# Patient Record
Sex: MALE | Race: WHITE | NOT HISPANIC OR LATINO | Employment: FULL TIME | URBAN - METROPOLITAN AREA
[De-identification: names, ages, dates, MRNs, and addresses within clinical notes are randomized per-mention and may not be internally consistent; named-entity substitution may affect disease eponyms.]

---

## 2017-07-10 ENCOUNTER — TRANSFERRED RECORDS (OUTPATIENT)
Dept: HEALTH INFORMATION MANAGEMENT | Facility: CLINIC | Age: 57
End: 2017-07-10

## 2018-08-01 NOTE — PROGRESS NOTES
"  SUBJECTIVE:   CC: Waqar Troncoso is an 58 year old male who presents for preventative health visit.     Healthy Habits:    Do you get at least three servings of calcium containing foods daily (dairy, green leafy vegetables, etc.)? {YES/NO, DAIRY INTAKE:193241::\"yes\"}    Amount of exercise or daily activities, outside of work: {AMOUNT EXERCISE:855266}    Problems taking medications regularly {Yes /No default:858088::\"No\"}    Medication side effects: {Yes /No default.:175883::\"No\"}    Have you had an eye exam in the past two years? {YESNOBLANK:136175}    Do you see a dentist twice per year? {YESNOBLANK:878048}    Do you have sleep apnea, excessive snoring or daytime drowsiness?{YESNOBLANK:255632}  {Outside tests to abstract? :439821}     {additional problems to add (Optional):647088}    Today's PHQ-2 Score:   PHQ-2 ( 1999 Pf[employer]) 8/7/2012   Q1: Little interest or pleasure in doing things 0   Q2: Feeling down, depressed or hopeless 0   PHQ-2 Score 0     {PHQ-2 LOOK IN ASSESSMENTS :135930}  Abuse: Current or Past(Physical, Sexual or Emotional)- {YES/NO/NA:157666}  Do you feel safe in your environment - {YES/NO/NA:326902}    Social History   Substance Use Topics     Smoking status: Current Every Day Smoker     Packs/day: 1.00     Years: 10.00     Types: Cigarettes     Smokeless tobacco: Former User     Types: Chew     Alcohol use 1.5 oz/week     3 drink(s) per week      Comment: 3 drinks per month      If you drink alcohol do you typically have >3 drinks per day or >7 drinks per week? {ETOH :476582}                      Last PSA: No results found for: PSA    Reviewed orders with patient. Reviewed health maintenance and updated orders accordingly - {Yes/No:389486::\"Yes\"}  {Chronicprobdata (Optional):986258}    Reviewed and updated as needed this visit by clinical staff         Reviewed and updated as needed this visit by Provider        {HISTORY OPTIONS (Optional):045079}    ROS:  { :279919::\"CONSTITUTIONAL: NEGATIVE " "for fever, chills, change in weight\",\"INTEGUMENTARY/SKIN: NEGATIVE for worrisome rashes, moles or lesions\",\"EYES: NEGATIVE for vision changes or irritation\",\"ENT: NEGATIVE for ear, mouth and throat problems\",\"RESP: NEGATIVE for significant cough or SOB\",\"CV: NEGATIVE for chest pain, palpitations or peripheral edema\",\"GI: NEGATIVE for nausea, abdominal pain, heartburn, or change in bowel habits\",\" male: negative for dysuria, hematuria, decreased urinary stream, erectile dysfunction, urethral discharge\",\"MUSCULOSKELETAL: NEGATIVE for significant arthralgias or myalgia\",\"NEURO: NEGATIVE for weakness, dizziness or paresthesias\",\"PSYCHIATRIC: NEGATIVE for changes in mood or affect\"}    OBJECTIVE:   There were no vitals taken for this visit.  EXAM:  {Exam Choices:952550}    {Diagnostic Test Results (Optional):892261::\"Diagnostic Test Results:\",\"none \"}    ASSESSMENT/PLAN:   {Diag Picklist:138062}    COUNSELING:  {MALE COUNSELING MESSAGES:700165::\"Reviewed preventive health counseling, as reflected in patient instructions\"}    BP Readings from Last 1 Encounters:   08/07/12 115/71     Estimated body mass index is 29.72 kg/(m^2) as calculated from the following:    Height as of 8/7/12: 6' (1.829 m).    Weight as of 8/7/12: 219 lb 2 oz (99.4 kg).    {BP Counseling- Complete if BP >= 120/80  (Optional):456871}  {Weight Management Plan (ACO) Complete if BMI is abnormal-  Ages 18-64  BMI >24.9.  Age 65+ with BMI <23 or >30 (Optional):872889}     reports that he has been smoking Cigarettes.  He has a 10.00 pack-year smoking history. He has quit using smokeless tobacco. His smokeless tobacco use included Chew.  {Tobacco Cessation -- Complete if patient is a smoker (Optional):924375}    Counseling Resources:  ATP IV Guidelines  Pooled Cohorts Equation Calculator  FRAX Risk Assessment  ICSI Preventive Guidelines  Dietary Guidelines for Americans, 2010  USDA's MyPlate  ASA Prophylaxis  Lung CA Screening    Tba Landry, " BRUNO  [facility]

## 2018-08-02 ENCOUNTER — OFFICE VISIT (OUTPATIENT)
Dept: FAMILY MEDICINE | Facility: CLINIC | Age: 58
End: 2018-08-02
Payer: COMMERCIAL

## 2018-08-02 VITALS
HEIGHT: 72 IN | TEMPERATURE: 97.3 F | OXYGEN SATURATION: 97 % | BODY MASS INDEX: 29.53 KG/M2 | RESPIRATION RATE: 14 BRPM | SYSTOLIC BLOOD PRESSURE: 114 MMHG | DIASTOLIC BLOOD PRESSURE: 71 MMHG | WEIGHT: 218 LBS | HEART RATE: 79 BPM

## 2018-08-02 DIAGNOSIS — Z71.89 ADVANCED DIRECTIVES, COUNSELING/DISCUSSION: ICD-10-CM

## 2018-08-02 DIAGNOSIS — Z00.00 ROUTINE GENERAL MEDICAL EXAMINATION AT A HEALTH CARE FACILITY: Primary | ICD-10-CM

## 2018-08-02 DIAGNOSIS — Z11.59 NEED FOR HEPATITIS C SCREENING TEST: ICD-10-CM

## 2018-08-02 DIAGNOSIS — I10 BENIGN ESSENTIAL HYPERTENSION: ICD-10-CM

## 2018-08-02 DIAGNOSIS — E78.5 HYPERLIPIDEMIA LDL GOAL <100: ICD-10-CM

## 2018-08-02 DIAGNOSIS — E11.9 TYPE 2 DIABETES MELLITUS WITHOUT COMPLICATION, WITHOUT LONG-TERM CURRENT USE OF INSULIN (H): ICD-10-CM

## 2018-08-02 LAB
ALBUMIN SERPL-MCNC: 4.2 G/DL (ref 3.4–5)
ALP SERPL-CCNC: 67 U/L (ref 40–150)
ALT SERPL W P-5'-P-CCNC: 30 U/L (ref 0–70)
ANION GAP SERPL CALCULATED.3IONS-SCNC: 10 MMOL/L (ref 3–14)
AST SERPL W P-5'-P-CCNC: 16 U/L (ref 0–45)
BILIRUB SERPL-MCNC: 1.2 MG/DL (ref 0.2–1.3)
BUN SERPL-MCNC: 17 MG/DL (ref 7–30)
CALCIUM SERPL-MCNC: 9.2 MG/DL (ref 8.5–10.1)
CHLORIDE SERPL-SCNC: 106 MMOL/L (ref 94–109)
CHOLEST SERPL-MCNC: 101 MG/DL
CO2 SERPL-SCNC: 25 MMOL/L (ref 20–32)
CREAT SERPL-MCNC: 0.78 MG/DL (ref 0.66–1.25)
CREAT UR-MCNC: 68 MG/DL
GFR SERPL CREATININE-BSD FRML MDRD: >90 ML/MIN/1.7M2
GLUCOSE SERPL-MCNC: 95 MG/DL (ref 70–99)
HBA1C MFR BLD: 8.9 % (ref 0–5.6)
HDLC SERPL-MCNC: 35 MG/DL
LDLC SERPL CALC-MCNC: 41 MG/DL
MICROALBUMIN UR-MCNC: 13 MG/L
MICROALBUMIN/CREAT UR: 19.85 MG/G CR (ref 0–17)
NONHDLC SERPL-MCNC: 66 MG/DL
POTASSIUM SERPL-SCNC: 4.3 MMOL/L (ref 3.4–5.3)
PROT SERPL-MCNC: 7.6 G/DL (ref 6.8–8.8)
SODIUM SERPL-SCNC: 141 MMOL/L (ref 133–144)
T4 FREE SERPL-MCNC: 0.81 NG/DL (ref 0.76–1.46)
TRIGL SERPL-MCNC: 126 MG/DL
TSH SERPL DL<=0.005 MIU/L-ACNC: 4.26 MU/L (ref 0.4–4)

## 2018-08-02 PROCEDURE — 99386 PREV VISIT NEW AGE 40-64: CPT | Performed by: PHYSICIAN ASSISTANT

## 2018-08-02 PROCEDURE — 86803 HEPATITIS C AB TEST: CPT | Performed by: PHYSICIAN ASSISTANT

## 2018-08-02 PROCEDURE — 36415 COLL VENOUS BLD VENIPUNCTURE: CPT | Performed by: PHYSICIAN ASSISTANT

## 2018-08-02 PROCEDURE — 80061 LIPID PANEL: CPT | Performed by: PHYSICIAN ASSISTANT

## 2018-08-02 PROCEDURE — 80053 COMPREHEN METABOLIC PANEL: CPT | Performed by: PHYSICIAN ASSISTANT

## 2018-08-02 PROCEDURE — 84439 ASSAY OF FREE THYROXINE: CPT | Performed by: PHYSICIAN ASSISTANT

## 2018-08-02 PROCEDURE — 83036 HEMOGLOBIN GLYCOSYLATED A1C: CPT | Performed by: PHYSICIAN ASSISTANT

## 2018-08-02 PROCEDURE — 82043 UR ALBUMIN QUANTITATIVE: CPT | Performed by: PHYSICIAN ASSISTANT

## 2018-08-02 PROCEDURE — 84443 ASSAY THYROID STIM HORMONE: CPT | Performed by: PHYSICIAN ASSISTANT

## 2018-08-02 RX ORDER — GLIPIZIDE 10 MG/1
10 TABLET, FILM COATED, EXTENDED RELEASE ORAL DAILY
COMMUNITY
Start: 2018-04-10 | End: 2018-08-02

## 2018-08-02 RX ORDER — TAMSULOSIN HYDROCHLORIDE 0.4 MG/1
0.4 CAPSULE ORAL DAILY
COMMUNITY
Start: 2017-10-12 | End: 2018-08-02

## 2018-08-02 RX ORDER — LISINOPRIL 10 MG/1
10 TABLET ORAL DAILY
Qty: 90 TABLET | Refills: 1 | Status: SHIPPED | OUTPATIENT
Start: 2018-08-02 | End: 2019-02-09

## 2018-08-02 RX ORDER — ATORVASTATIN CALCIUM 80 MG/1
80 TABLET, FILM COATED ORAL DAILY
COMMUNITY
Start: 2016-09-14 | End: 2018-08-02

## 2018-08-02 RX ORDER — ATORVASTATIN CALCIUM 80 MG/1
80 TABLET, FILM COATED ORAL DAILY
Qty: 90 TABLET | Refills: 3 | Status: SHIPPED | OUTPATIENT
Start: 2018-08-02 | End: 2019-03-28

## 2018-08-02 RX ORDER — GLIPIZIDE 10 MG/1
10 TABLET, FILM COATED, EXTENDED RELEASE ORAL DAILY
Qty: 90 TABLET | Refills: 1 | Status: SHIPPED | OUTPATIENT
Start: 2018-08-02 | End: 2019-03-28

## 2018-08-02 ASSESSMENT — ENCOUNTER SYMPTOMS
ARTHRALGIAS: 0
CONSTIPATION: 0
HEMATOCHEZIA: 0
NAUSEA: 0
SHORTNESS OF BREATH: 0
DYSURIA: 0
COUGH: 0
EYE PAIN: 0
JOINT SWELLING: 0
HEADACHES: 0
CHILLS: 0
PARESTHESIAS: 0
DIARRHEA: 1
FEVER: 0
NERVOUS/ANXIOUS: 0
MYALGIAS: 0
FREQUENCY: 0
DIZZINESS: 0
WEAKNESS: 0
HEMATURIA: 0
PALPITATIONS: 0
SORE THROAT: 0
ABDOMINAL PAIN: 0
HEARTBURN: 0

## 2018-08-02 NOTE — PROGRESS NOTES
SUBJECTIVE:   CC: Waqar Troncoso is an 58 year old male who presents for preventative health visit.  New patient due to insurance change.    Physical   Annual:     Getting at least 3 servings of Calcium per day:  NO    Bi-annual eye exam:  Yes    Dental care twice a year:  Yes    Sleep apnea or symptoms of sleep apnea:  None    Diet:  Diabetic    Frequency of exercise:  2-3 days/week    Duration of exercise:  15-30 minutes    Taking medications regularly:  Yes    Medication side effects:  None    Additional concerns today:  No        No concerns   History of DM, Hyperlipidemia and hypertension.   Was on meds for BPH but is asymptomatic.     Today's PHQ-2 Score:   PHQ-2 ( 1999 Pfizer) 8/2/2018   Q1: Little interest or pleasure in doing things 0   Q2: Feeling down, depressed or hopeless 0   PHQ-2 Score 0   Q1: Little interest or pleasure in doing things Not at all   Q2: Feeling down, depressed or hopeless Not at all   PHQ-2 Score 0       Abuse: Current or Past(Physical, Sexual or Emotional)- No  Do you feel safe in your environment - Yes    Social History   Substance Use Topics     Smoking status: Current Every Day Smoker     Packs/day: 1.00     Years: 10.00     Types: Cigarettes     Smokeless tobacco: Former User     Types: Chew     Alcohol use 1.5 oz/week     3 Standard drinks or equivalent per week      Comment: 3 drinks per month     Alcohol Use 8/2/2018   If you drink alcohol do you typically have greater than 3 drinks per day OR greater than 7 drinks per week? No     Last PSA: No results found for: PSA    Reviewed orders with patient. Reviewed health maintenance and updated orders accordingly - Yes  Labs reviewed in EPIC  BP Readings from Last 3 Encounters:   08/02/18 114/71   08/07/12 115/71    Wt Readings from Last 3 Encounters:   08/02/18 218 lb (98.9 kg)   08/07/12 219 lb 2 oz (99.4 kg)                  Patient Active Problem List   Diagnosis     Hyperlipidemia LDL goal <100     Type 2 diabetes, HbA1c goal <  7% (H)     Advanced directives, counseling/discussion     Benign essential hypertension     Past Surgical History:   Procedure Laterality Date     COLONOSCOPY       FRACTURE TX, ANKLE RT/LT  1999    Fracture TX Ankle left, 13 surgeries per pt     HERNIA REPAIR, UMBILICAL  1995     ROTATOR CUFF REPAIR RT/LT  ~1990    right and left     VASCULAR SURGERY  >1999    reconstruction post left ankle injury       Social History   Substance Use Topics     Smoking status: Current Every Day Smoker     Packs/day: 1.00     Years: 10.00     Types: Cigarettes     Smokeless tobacco: Former User     Types: Chew     Alcohol use 1.5 oz/week     3 Standard drinks or equivalent per week      Comment: 3 drinks per month     Family History   Problem Relation Age of Onset     Diabetes Mother      Diabetes Father      Diabetes Maternal Grandmother      Diabetes Maternal Grandfather      Diabetes Paternal Grandmother      Diabetes Paternal Grandfather      Cancer - colorectal No family hx of      Prostate Cancer No family hx of          Current Outpatient Prescriptions   Medication Sig Dispense Refill     aspirin 81 MG tablet Take 1 tablet (81 mg) by mouth daily See provider for further refills. 1 tablet 0     atorvastatin (LIPITOR) 80 MG tablet Take 1 tablet (80 mg) by mouth daily 90 tablet 3     glipiZIDE (GLUCOTROL XL) 10 MG 24 hr tablet Take 1 tablet (10 mg) by mouth daily 90 tablet 1     lisinopril (PRINIVIL/ZESTRIL) 10 MG tablet Take 1 tablet (10 mg) by mouth daily 90 tablet 1     LISINOPRIL PO Take 10 mg by mouth daily       metFORMIN (GLUCOPHAGE) 1000 MG tablet Take 1 tablet (1,000 mg) by mouth 2 times daily (with meals) 180 tablet 1     [DISCONTINUED] atorvastatin (LIPITOR) 80 MG tablet Take 80 mg by mouth daily       [DISCONTINUED] glipiZIDE (GLUCOTROL XL) 10 MG 24 hr tablet Take 10 mg by mouth daily       [DISCONTINUED] metFORMIN (GLUCOPHAGE) 1000 MG tablet Take 1,000 mg by mouth 2 times daily (with meals).       Allergies    Allergen Reactions     Oxycontin [Kdc:Ci Pigment Blue 63+Oxycodone]      Sweating, hyperventilating per patient      Recent Labs   Lab Test  08/07/12   0837   A1C  6.4*   LDL  77   HDL  34*   TRIG  170*   CR  0.78   GFRESTIMATED  >90   GFRESTBLACK  >90   TSH  4.11        Reviewed and updated as needed this visit by clinical staff  Tobacco  Allergies  Meds  Med Hx  Surg Hx  Fam Hx  Soc Hx        Reviewed and updated as needed this visit by Provider  Surg Hx          Past Medical History:   Diagnosis Date     Benign essential hypertension 8/2/2018      Past Surgical History:   Procedure Laterality Date     COLONOSCOPY       FRACTURE TX, ANKLE RT/LT  1999    Fracture TX Ankle left, 13 surgeries per pt     HERNIA REPAIR, UMBILICAL  1995     ROTATOR CUFF REPAIR RT/LT  ~1990    right and left     VASCULAR SURGERY  >1999    reconstruction post left ankle injury       Review of Systems   Constitutional: Negative for chills and fever.   HENT: Negative for congestion, ear pain, hearing loss and sore throat.    Eyes: Negative for pain and visual disturbance.   Respiratory: Negative for cough and shortness of breath.    Cardiovascular: Negative for chest pain, palpitations and peripheral edema.   Gastrointestinal: Positive for diarrhea. Negative for abdominal pain, constipation, heartburn, hematochezia and nausea.   Genitourinary: Positive for impotence. Negative for discharge, dysuria, frequency, genital sores, hematuria and urgency.   Musculoskeletal: Negative for arthralgias, joint swelling and myalgias.   Skin: Negative for rash.   Neurological: Negative for dizziness, weakness, headaches and paresthesias.   Psychiatric/Behavioral: Negative for mood changes. The patient is not nervous/anxious.          OBJECTIVE:   /71  Pulse 79  Temp 97.3  F (36.3  C) (Oral)  Resp 14  Ht 6' (1.829 m)  Wt 218 lb (98.9 kg)  SpO2 97%  BMI 29.57 kg/m2    Physical Exam  GENERAL: healthy, alert and no distress  EYES: Eyes  grossly normal to inspection, PERRL and conjunctivae and sclerae normal  HENT: ear canals and TM's normal, nose and mouth without ulcers or lesions  NECK: no adenopathy, no asymmetry, masses, or scars and thyroid normal to palpation  RESP: lungs clear to auscultation - no rales, rhonchi or wheezes  CV: regular rate and rhythm, normal S1 S2, no S3 or S4, no murmur, click or rub, no peripheral edema and peripheral pulses strong  ABDOMEN: soft, nontender, no hepatosplenomegaly, no masses and bowel sounds normal   (male): normal male genitalia without lesions or urethral discharge, no hernia  MS: no gross musculoskeletal defects noted, no edema  SKIN: no suspicious lesions or rashes  NEURO: Normal strength and tone, mentation intact and speech normal  PSYCH: mentation appears normal, affect normal/bright    Diagnostic Test Results:  No results found for this or any previous visit (from the past 24 hour(s)).    ASSESSMENT/PLAN:       ICD-10-CM    1. Routine general medical examination at a health care facility Z00.00 Lipid panel reflex to direct LDL Fasting     Comprehensive metabolic panel   2. Type 2 diabetes mellitus without complication, without long-term current use of insulin (H) E11.9 HEMOGLOBIN A1C     Albumin Random Urine Quantitative with Creat Ratio     TSH WITH FREE T4 REFLEX     Comprehensive metabolic panel     DIABETES EDUCATOR REFERRAL     glipiZIDE (GLUCOTROL XL) 10 MG 24 hr tablet     metFORMIN (GLUCOPHAGE) 1000 MG tablet   3. Benign essential hypertension I10 lisinopril (PRINIVIL/ZESTRIL) 10 MG tablet   4. Hyperlipidemia LDL goal <100 E78.5 atorvastatin (LIPITOR) 80 MG tablet   5. Advanced directives, counseling/discussion Z71.89    6. Need for hepatitis C screening test Z11.59 Hepatitis C Screen Reflex to HCV RNA Quant and Genotype   labs pending  meds refilled.   Work on Healthy diet and exercise. Getting heart rate elevated for 30 mins most days of week.  Ok to stop flomax for now and to get back  on it if symptoms arise.   Recheck DM/blood pressure in 6 months.     COUNSELING:   Reviewed preventive health counseling, as reflected in patient instructions       Regular exercise       Healthy diet/nutrition    BP Readings from Last 1 Encounters:   08/02/18 114/71     Estimated body mass index is 29.57 kg/(m^2) as calculated from the following:    Height as of this encounter: 6' (1.829 m).    Weight as of this encounter: 218 lb (98.9 kg).      Weight management plan: Discussed healthy diet and exercise guidelines and patient will follow up in 12 months in clinic to re-evaluate.     reports that he has been smoking Cigarettes.  He has a 10.00 pack-year smoking history. He has quit using smokeless tobacco. His smokeless tobacco use included Chew.      Counseling Resources:  ATP IV Guidelines  Pooled Cohorts Equation Calculator  FRAX Risk Assessment  ICSI Preventive Guidelines  Dietary Guidelines for Americans, 2010  USDA's MyPlate  ASA Prophylaxis  Lung CA Screening    Tab Landry PA-C  [facility]

## 2018-08-02 NOTE — MR AVS SNAPSHOT
After Visit Summary   8/2/2018    Waqar Troncoso    MRN: [id number]           Patient Information     Date Of Birth          1960        Visit Information        Provider Department      8/2/2018 2:50 PM Tab Landry PA-C [facility]        Today's Diagnoses     Routine general medical examination at a health care facility    -  1    Type 2 diabetes mellitus without complication, without long-term current use of insulin (H)        Hyperlipidemia LDL goal <100        Advanced directives, counseling/discussion        Need for hepatitis C screening test        Benign essential hypertension          Care Instructions      Preventive Health Recommendations  Male Ages 50 - 64    Yearly exam:             See your health care provider every year in order to  o   Review health changes.   o   Discuss preventive care.    o   Review your medicines if your doctor has prescribed any.     Have a cholesterol test every 5 years, or more frequently if you are at risk for high cholesterol/heart disease.     Have a diabetes test (fasting glucose) every three years. If you are at risk for diabetes, you should have this test more often.     Have a colonoscopy at age 50, or have a yearly FIT test (stool test). These exams will check for colon cancer.      Talk with your health care provider about whether or not a prostate cancer screening test (PSA) is right for you.    You should be tested each year for STDs (sexually transmitted diseases), if you re at risk.     Shots: Get a flu shot each year. Get a tetanus shot every 10 years.     Nutrition:    Eat at least 5 servings of fruits and vegetables daily.     Eat whole-grain bread, whole-wheat pasta and brown rice instead of white grains and rice.     Get adequate Calcium and Vitamin D.     Lifestyle    Exercise for at least 150 minutes a week (30 minutes a day, 5 days a week). This will help you control your weight and prevent disease.     Limit  alcohol to one drink per day.     No smoking.     Wear sunscreen to prevent skin cancer.     See your dentist every six months for an exam and cleaning.     See your eye doctor every 1 to 2 years.            Follow-ups after your visit        Additional Services     DIABETES EDUCATOR REFERRAL       DIABETES SELF MANAGEMENT TRAINING (DSMT)      Your provider has referred you to Diabetes Education: FMG: Diabetes Education - All [facility] [phone]   [email or web address]     If an urgent visit is needed or A1C is above 12, Care Team to call the Diabetes  Education Team at [phone] or send an In Basket message to the Diabetes Education Pool (P DIAB ED-PATIENT CARE).    A  will call you to make your appointment. If it has been more than 3 business days since your referral was placed, please call the above phone number to schedule.    Type of training and number of hours: Previous Diagnosis: Follow-up DSMT - 2 hours.    Diabetes Type: Type 2 - On Oral Medication   Medicare covers: 10 hours of initial DSMT in 12 month period from the time of first visit, plus 2 hours of follow-up DSMT annually, and additional hours as requested for insulin training.         Diabetes Co-Morbidities: hypertension               A1C Goal:  <8.0       A1C is: Lab Results       Component                Value               Date                       A1C                      6.4                 08/07/2012              MEDICAL NUTRITION THERAPY (MNT) for Diabetes    Medical Nutrition Therapy with a Registered Dietitian can be provided in coordination with Diabetes Self-Management Training to assist in achieving optimal diabetes management.    MNT Type and Hours: Do not initiate MNT at this time.                       Medicare will cover: 3 hours initial MNT in 12 month period after first visit, plus 2 hours of follow-up MNT annually        Diabetes Education Topics:  "Comprehensive Knowledge Assessment and Instruction    Special Educational Needs Requiring Individual DSMT: None      Please be aware that coverage of these services is subject to the terms and limitations of your health insurance plan.  Call member services at your health plan to determine Diabetes Self-Management Training (Codes  and ) and Medical Nutrition Therapy (Codes 97151 and 11426) benefits and ask which blood glucose monitor brands are covered by your plan.  Please bring the following with you to your appointment:    (1)  List of current medications   (2)  List of Blood Glucose Monitor brands that are covered by your insurance plan  (3)  Blood Glucose Monitor and log book  (4)   Food records for the 3 days prior to your visit    The Certified Diabetes Educator may make diabetes medication adjustments per the CDE Protocol and Collaborative Practice Agreement.                  Who to contact     If you have questions or need follow up information about today's clinic visit or your schedule please contact [facility] directly at [phone].  Normal or non-critical lab and imaging results will be communicated to you by MyChart, letter or phone within 4 business days after the clinic has received the results. If you do not hear from us within 7 days, please contact the clinic through [employer] or phone. If you have a critical or abnormal lab result, we will notify you by phone as soon as possible.  Submit refill requests through [facility] or call your pharmacy and they will forward the refill request to us. Please allow 3 business days for your refill to be completed.          Additional Information About Your Visit        [facility] Information     [facility] lets you send messages to your doctor, view your test results, renew your prescriptions, schedule appointments and more. To sign up, go to [email or web address] . Click on \"Log in\" on the left side of the screen, which will take you to " "the Welcome page. Then click on \"Sign up Now\" on the right side of the page.     You will be asked to enter the access code listed below, as well as some personal information. Please follow the directions to create your username and password.     Your access code is: [email or web address]  Expires: 10/31/2018  3:10 PM     Your access code will  in 90 days. If you need help or a new code, please call your [place] clinic or [phone].        Care EveryWhere ID     This is your Care EveryWhere ID. This could be used by other organizations to access your [place] medical records  [id number]        Your Vitals Were     Pulse Temperature Respirations Height Pulse Oximetry BMI (Body Mass Index)    79 97.3  F (36.3  C) (Oral) 14 6' (1.829 m) 97% 29.57 kg/m2       Blood Pressure from Last 3 Encounters:   18 114/71   12 115/71    Weight from Last 3 Encounters:   18 218 lb (98.9 kg)   12 219 lb 2 oz (99.4 kg)              We Performed the Following     Albumin Random Urine Quantitative with Creat Ratio     Comprehensive metabolic panel     DIABETES EDUCATOR REFERRAL     HEMOGLOBIN A1C     Hepatitis C Screen Reflex to HCV RNA Quant and Genotype     Lipid panel reflex to direct LDL Fasting     TSH WITH FREE T4 REFLEX          Today's Medication Changes          These changes are accurate as of 18  3:10 PM.  If you have any questions, ask your nurse or doctor.               These medicines have changed or have updated prescriptions.        Dose/Directions    * LISINOPRIL PO   This may have changed:  Another medication with the same name was added. Make sure you understand how and when to take each.   Changed by:  Tab Landry PA-C        Dose:  10 mg   Take 10 mg by mouth daily   Refills:  0       * lisinopril 10 MG tablet   Commonly known as:  PRINIVIL/ZESTRIL   This may have changed:  You were already taking a medication with the same name, and this prescription was added. Make sure you " understand how and when to take each.   Used for:  Benign essential hypertension   Changed by:  Tab Landry PA-C        Dose:  10 mg   Take 1 tablet (10 mg) by mouth daily   Quantity:  90 tablet   Refills:  1       * Notice:  This list has 2 medication(s) that are the same as other medications prescribed for you. Read the directions carefully, and ask your doctor or other care provider to review them with you.         Where to get your medicines      These medications were sent to [id number] - [place], [place] - [address] [address]  [address] [address], [facility] [address]    Hours:  test fax successful 4/5/04 kr Phone:  [phone]     atorvastatin 80 MG tablet    glipiZIDE 10 MG 24 hr tablet    lisinopril 10 MG tablet    metFORMIN 1000 MG tablet                Primary Care Provider Office Phone # Fax #    [facility] [phone] [address] [address] [address]        Equal Access to Services     ТАТЬЯНА KENNEDY AH: Hadii aad ku hadasho Soomaali, waaxda luqadaha, qaybta kaalmada adeegyada, waxay idiin hayaan cortney henry . So [facility] [phone].    ATENCIÓN: Si habla español, tiene a phipps disposición servicios gratuitos de asistencia lingüística. [facility] [phone].    We comply with applicable federal civil rights laws and [place] laws. We do not discriminate on the basis of race, color, national origin, age, disability, sex, sexual orientation, or gender identity.            Thank you!     Thank you for choosing [facility]  for your care. Our goal is always to provide you with excellent care. Hearing back from our patients is one way we can continue to improve our services. Please take a few minutes to complete the written survey that you may receive in the mail after your visit with us. Thank you!             Your Updated Medication List - Protect others around you: Learn how to safely use, store and throw away your medicines at  www.disposemymeds.org.          This list is accurate as of 8/2/18  3:10 PM.  Always use your most recent med list.                   Brand Name Dispense Instructions for use Diagnosis    aspirin 81 MG tablet     1 tablet    Take 1 tablet (81 mg) by mouth daily See provider for further refills.    Type 2 diabetes, HbA1c goal < 7% (H)       atorvastatin 80 MG tablet    LIPITOR    90 tablet    Take 1 tablet (80 mg) by mouth daily    Hyperlipidemia LDL goal <100       glipiZIDE 10 MG 24 hr tablet    GLUCOTROL XL    90 tablet    Take 1 tablet (10 mg) by mouth daily    Type 2 diabetes mellitus without complication, without long-term current use of insulin (H)       * LISINOPRIL PO      Take 10 mg by mouth daily        * lisinopril 10 MG tablet    PRINIVIL/ZESTRIL    90 tablet    Take 1 tablet (10 mg) by mouth daily    Benign essential hypertension       metFORMIN 1000 MG tablet    GLUCOPHAGE    180 tablet    Take 1 tablet (1,000 mg) by mouth 2 times daily (with meals)    Type 2 diabetes mellitus without complication, without long-term current use of insulin (H)       * Notice:  This list has 2 medication(s) that are the same as other medications prescribed for you. Read the directions carefully, and ask your doctor or other care provider to review them with you.

## 2018-08-02 NOTE — NURSING NOTE
Chief Complaint   Patient presents with     Physical     Health Maintenance     ADP       Initial /71  Pulse 79  Temp 97.3  F (36.3  C) (Oral)  Resp 14  Ht 6' (1.829 m)  Wt 218 lb (98.9 kg)  SpO2 97%  BMI 29.57 kg/m2 Estimated body mass index is 29.57 kg/(m^2) as calculated from the following:    Height as of this encounter: 6' (1.829 m).    Weight as of this encounter: 218 lb (98.9 kg).  Medication Reconciliation: complete  Viry Kam, GINA

## 2018-08-03 LAB — HCV AB SERPL QL IA: NONREACTIVE

## 2018-12-19 ENCOUNTER — TELEPHONE (OUTPATIENT)
Dept: OTHER | Facility: CLINIC | Age: 58
End: 2018-12-19

## 2019-02-09 DIAGNOSIS — I10 BENIGN ESSENTIAL HYPERTENSION: ICD-10-CM

## 2019-02-11 RX ORDER — LISINOPRIL 10 MG/1
TABLET ORAL
Qty: 90 TABLET | Refills: 1 | Status: SHIPPED | OUTPATIENT
Start: 2019-02-11 | End: 2019-03-28

## 2019-03-28 ENCOUNTER — OFFICE VISIT (OUTPATIENT)
Dept: FAMILY MEDICINE | Facility: CLINIC | Age: 59
End: 2019-03-28
Payer: COMMERCIAL

## 2019-03-28 VITALS
BODY MASS INDEX: 28.18 KG/M2 | RESPIRATION RATE: 16 BRPM | HEART RATE: 67 BPM | WEIGHT: 207.8 LBS | TEMPERATURE: 97 F | DIASTOLIC BLOOD PRESSURE: 83 MMHG | OXYGEN SATURATION: 99 % | SYSTOLIC BLOOD PRESSURE: 133 MMHG

## 2019-03-28 DIAGNOSIS — I10 HYPERTENSION GOAL BP (BLOOD PRESSURE) < 140/90: ICD-10-CM

## 2019-03-28 DIAGNOSIS — E78.5 HYPERLIPIDEMIA LDL GOAL <100: ICD-10-CM

## 2019-03-28 DIAGNOSIS — E11.65 TYPE 2 DIABETES MELLITUS WITH HYPERGLYCEMIA, WITHOUT LONG-TERM CURRENT USE OF INSULIN (H): Primary | ICD-10-CM

## 2019-03-28 LAB — HBA1C MFR BLD: 7.8 % (ref 0–5.6)

## 2019-03-28 PROCEDURE — 99207 C FOOT EXAM  NO CHARGE: CPT | Performed by: FAMILY MEDICINE

## 2019-03-28 PROCEDURE — 83036 HEMOGLOBIN GLYCOSYLATED A1C: CPT | Performed by: FAMILY MEDICINE

## 2019-03-28 PROCEDURE — 36415 COLL VENOUS BLD VENIPUNCTURE: CPT | Performed by: FAMILY MEDICINE

## 2019-03-28 PROCEDURE — 99214 OFFICE O/P EST MOD 30 MIN: CPT | Performed by: FAMILY MEDICINE

## 2019-03-28 RX ORDER — GLIPIZIDE 10 MG/1
20 TABLET, FILM COATED, EXTENDED RELEASE ORAL DAILY
Qty: 180 TABLET | Refills: 3 | Status: SHIPPED | OUTPATIENT
Start: 2019-03-28 | End: 2020-03-16

## 2019-03-28 RX ORDER — LISINOPRIL 10 MG/1
10 TABLET ORAL DAILY
Qty: 90 TABLET | Refills: 3 | Status: SHIPPED | OUTPATIENT
Start: 2019-03-28 | End: 2020-07-09

## 2019-03-28 RX ORDER — ATORVASTATIN CALCIUM 80 MG/1
80 TABLET, FILM COATED ORAL DAILY
Qty: 90 TABLET | Refills: 3 | Status: SHIPPED | OUTPATIENT
Start: 2019-03-28 | End: 2020-07-09

## 2019-03-28 NOTE — PROGRESS NOTES
"  SUBJECTIVE:   CC: Waqar Troncoso is an 59 year old male who presents for preventive health visit.     Healthy Habits:    Do you get at least three servings of calcium containing foods daily (dairy, green leafy vegetables, etc.)? { :270147::\"yes\"}    Amount of exercise or daily activities, outside of work: { :364721}    Problems taking medications regularly { :838147::\"No\"}    Medication side effects: { :029693::\"No\"}    Have you had an eye exam in the past two years? { :442282}    Do you see a dentist twice per year? { :483410}    Do you have sleep apnea, excessive snoring or daytime drowsiness?{ :595784}  {Outside tests to abstract? :607387}    {additional problems to add (Optional):308798}    Today's PHQ-2 Score:   PHQ-2 ( 1999 [employer]) 8/2/2018 8/7/2012   Q1: Little interest or pleasure in doing things 0 0   Q2: Feeling down, depressed or hopeless 0 0   PHQ-2 Score 0 0   Q1: Little interest or pleasure in doing things Not at all -   Q2: Feeling down, depressed or hopeless Not at all -   PHQ-2 Score 0 -     {PHQ-2 LOOK IN ASSESSMENTS (Optional) :253914}  Abuse: Current or Past(Physical, Sexual or Emotional)- {YES/NO/NA:499114}  Do you feel safe in your environment? {YES/NO/NA:972741}    Social History     Tobacco Use     Smoking status: Current Every Day Smoker     Packs/day: 1.00     Years: 10.00     Pack years: 10.00     Types: Cigarettes     Smokeless tobacco: Former User     Types: Chew   Substance Use Topics     Alcohol use: Yes     Alcohol/week: 1.5 oz     Types: 3 Standard drinks or equivalent per week     Comment: 3 drinks per month     If you drink alcohol do you typically have >3 drinks per day or >7 drinks per week? {ETOH :469447}                      Last PSA: No results found for: PSA    Reviewed orders with patient. Reviewed health maintenance and updated orders accordingly - {Yes/No:970177::\"Yes\"}  {Chronicprobdata (Optional):524289}    Reviewed and updated as needed this visit by clinical " "staff         Reviewed and updated as needed this visit by Provider        {HISTORY OPTIONS (Optional):089886}    ROS:  { :450945::\"CONSTITUTIONAL: NEGATIVE for fever, chills, change in weight\",\"INTEGUMENTARY/SKIN: NEGATIVE for worrisome rashes, moles or lesions\",\"EYES: NEGATIVE for vision changes or irritation\",\"ENT: NEGATIVE for ear, mouth and throat problems\",\"RESP: NEGATIVE for significant cough or SOB\",\"CV: NEGATIVE for chest pain, palpitations or peripheral edema\",\"GI: NEGATIVE for nausea, abdominal pain, heartburn, or change in bowel habits\",\" male: negative for dysuria, hematuria, decreased urinary stream, erectile dysfunction, urethral discharge\",\"MUSCULOSKELETAL: NEGATIVE for significant arthralgias or myalgia\",\"NEURO: NEGATIVE for weakness, dizziness or paresthesias\",\"PSYCHIATRIC: NEGATIVE for changes in mood or affect\"}    OBJECTIVE:   There were no vitals taken for this visit.  EXAM:  {Exam Choices:725288}    {Diagnostic Test Results (Optional):223777::\"Diagnostic Test Results:\",\"none \"}    ASSESSMENT/PLAN:   {Diag Picklist:536104}    COUNSELING:  {MALE COUNSELING MESSAGES:735992::\"Reviewed preventive health counseling, as reflected in patient instructions\"}    BP Readings from Last 1 Encounters:   08/02/18 114/71     Estimated body mass index is 29.57 kg/m  as calculated from the following:    Height as of 8/2/18: 1.829 m (6').    Weight as of 8/2/18: 98.9 kg (218 lb).    {BP Counseling- Complete if BP >= 120/80  (Optional):321972}  {Weight Management Plan (ACO) Complete if BMI is abnormal-  Ages 18-64  BMI >24.9.  Age 65+ with BMI <23 or >30 (Optional):058304}     reports that he has been smoking cigarettes.  He has a 10.00 pack-year smoking history. He has quit using smokeless tobacco. His smokeless tobacco use included chew.  {Tobacco Cessation -- Complete if patient is a smoker (Optional):559447}    Counseling Resources:  ATP IV Guidelines  Pooled Cohorts Equation Calculator  FRAX Risk " Assessment  ICSI Preventive Guidelines  Dietary Guidelines for Americans, 2010  USDA's MyPlate  ASA Prophylaxis  Lung CA Screening    Raquel Castaneda MD  [facility]

## 2019-03-28 NOTE — PROGRESS NOTES
SUBJECTIVE:   Waqar Troncoso is a 59 year old male who presents to clinic today for the following health issues:    Had a Physical in 8/2018        Diabetes Follow-up        Patient is checking blood sugars: not at all    Diabetic concerns: None     Symptoms of hypoglycemia (low blood sugar): none     Paresthesias (numbness or burning in feet) or sores: No     Date of last diabetic eye exam (annually):  Less than a year ago with [facility] sent   Date of last Urine micro albumin screen, (annually):    Component      Latest Ref Rng & Units 8/2/2018   Creatinine Urine      mg/dL 68   Albumin Urine mg/L      mg/L 13   Albumin Urine mg/g Cr      0 - 17 mg/g Cr 19.85 (H)         Pneumococcal Vaccine:  Yes- utd    Influenza Vaccine (annually):   Yes: 3/28/19    Tobacco free:  No    Aspirin use:  Yes: 81 mg     Hyperlipidemia Follow-Up      Rate your low fat/cholesterol diet?: fair    Taking statin?  Yes, no muscle aches from statin    Other lipid medications/supplements?:  None    Last lipid panel    Recent Labs   Lab Test 08/02/18  1515 08/07/12  0837   CHOL 101 144   HDL 35* 34*   LDL 41 77   TRIG 126 170*   CHOLHDLRATIO  --  4.3         Hypertension Follow-up      Outpatient blood pressures are not being checked.    Low Salt Diet: low salt    BP Readings from Last 3 Encounters:   03/28/19 133/83   08/02/18 114/71   08/07/12 115/71       Amount of exercise or physical activity: active at work     Problems taking medications regularly: No    Medication side effects: metformin- upset stomach/ diarrhea     Diet: regular (no restrictions)        Problem list and histories reviewed & adjusted, as indicated.  Additional history: as documented    Patient Active Problem List   Diagnosis     Hyperlipidemia LDL goal <100     Type 2 diabetes, HbA1c goal < 7% (H)     Advanced directives, counseling/discussion     Benign essential hypertension     Past Surgical History:   Procedure Laterality Date     COLONOSCOPY        FRACTURE TX, ANKLE RT/LT  1999    Fracture TX Ankle left, 13 surgeries per pt     HERNIA REPAIR, UMBILICAL  1995     ROTATOR CUFF REPAIR RT/LT  ~1990    right and left     VASCULAR SURGERY  >1999    reconstruction post left ankle injury       Social History     Tobacco Use     Smoking status: Light Tobacco Smoker     Packs/day: 1.00     Years: 10.00     Pack years: 10.00     Types: Cigarettes     Smokeless tobacco: Former User     Types: Chew   Substance Use Topics     Alcohol use: Yes     Alcohol/week: 1.5 oz     Types: 3 Standard drinks or equivalent per week     Comment: 3 drinks per month     Family History   Problem Relation Age of Onset     Diabetes Mother      Diabetes Father      Diabetes Maternal Grandmother      Diabetes Maternal Grandfather      Diabetes Paternal Grandmother      Diabetes Paternal Grandfather      Cancer - colorectal No family hx of      Prostate Cancer No family hx of          Current Outpatient Medications   Medication Sig Dispense Refill     aspirin (ASA) 81 MG tablet Take 1 tablet (81 mg) by mouth daily 90 tablet 3     atorvastatin (LIPITOR) 80 MG tablet Take 1 tablet (80 mg) by mouth daily 90 tablet 3     glipiZIDE (GLUCOTROL XL) 10 MG 24 hr tablet Take 2 tablets (20 mg) by mouth daily 180 tablet 3     lisinopril (PRINIVIL/ZESTRIL) 10 MG tablet Take 1 tablet (10 mg) by mouth daily 90 tablet 3     metFORMIN (GLUCOPHAGE) 1000 MG tablet Take 1 tablet (1,000 mg) by mouth 2 times daily (with meals) 180 tablet 3     Allergies   Allergen Reactions     Oxycontin [Kdc:Ci Pigment Blue 63+Oxycodone]      Sweating, hyperventilating per patient        Reviewed and updated as needed this visit by clinical staff  Tobacco  Allergies  Meds  Surg Hx  Fam Hx       Reviewed and updated as needed this visit by Provider  Surg Hx  Fam Hx         ROS:  Constitutional, HEENT, cardiovascular, pulmonary, gi and gu systems are negative, except as otherwise noted.    OBJECTIVE:     /83    Pulse 67   Temp 97  F (36.1  C) (Tympanic)   Resp 16   Wt 94.3 kg (207 lb 12.8 oz)   SpO2 99%   BMI 28.18 kg/m    Body mass index is 28.18 kg/m .  GENERAL: healthy, alert and no distress  RESP: lungs clear to auscultation - no rales, rhonchi or wheezes  CV: regular rate and rhythm, normal S1 S2, no S3 or S4, no murmur, click or rub, no peripheral edema and peripheral pulses strong  Diabetic foot exam: normal DP and PT pulses, no trophic changes or ulcerative lesions, normal sensory exam and normal monofilament exam    Diagnostic Test Results:  Reviewed and discussed with patient prior to discharge.  Results for orders placed or performed in visit on 03/28/19   Hemoglobin A1c   Result Value Ref Range    Hemoglobin A1C 7.8 (H) 0 - 5.6 %         ASSESSMENT/PLAN:   Waqar was seen today for lipids, hypertension and diabetes.    Diagnoses and all orders for this visit:    Type 2 diabetes mellitus with hyperglycemia, without long-term current use of insulin (H), uncontrolled. A1C 7.8 today. Goal A1C < 7.0  -     Hemoglobin A1c  -     Refill: metFORMIN (GLUCOPHAGE) 1000 MG tablet; Take 1 tablet (1,000 mg) by mouth 2 times daily (with meals)  -     Increase dose: glipiZIDE (GLUCOTROL XL) 10 MG 24 hr tablet; Take 2 tablets (20 mg) by mouth daily  -     Refill: aspirin (ASA) 81 MG tablet; Take 1 tablet (81 mg) by mouth daily  -     FOOT EXAM    Hypertension goal BP (blood pressure) < 140/90, controlled  -    Refill:  lisinopril (PRINIVIL/ZESTRIL) 10 MG tablet; Take 1 tablet (10 mg) by mouth daily    Hyperlipidemia LDL goal <100, fairly cntrolled  -     Refill: atorvastatin (LIPITOR) 80 MG tablet; Take 1 tablet (80 mg) by mouth daily    Follow up in 3 month- Diabetic recheck      Raquel Castaneda MD  [facility]

## 2019-05-29 ENCOUNTER — TELEPHONE (OUTPATIENT)
Dept: FAMILY MEDICINE | Facility: CLINIC | Age: 59
End: 2019-05-29

## 2019-05-29 NOTE — TELEPHONE ENCOUNTER
Reason for Call:  Form, our goal is to have forms completed with 72 hours, however, some forms may require a visit or additional information.    Type of letter, form or note:  Proof of Annual Physical    Who is the form from?: Patient    Where did the form come from: Patient or family brought in       What clinic location was the form placed at?: [facility]    Where the form was placed: 's Box Box/Folder    What number is listed as a contact on the form?: [phone]       Additional comments: None    Call taken on 5/29/2019 at 4:47 PM by Luna Alaniz

## 2019-05-30 NOTE — TELEPHONE ENCOUNTER
I brought the completed/signed form up to the  and it is ready for .  I called the patient and LM.  Amanda Pritchard,

## 2019-05-30 NOTE — TELEPHONE ENCOUNTER
Form for Proof of Physical placed in providers basket, Last Physical 08/02/2015. Route back to team when complete.  DANY Hairston

## 2019-05-31 NOTE — TELEPHONE ENCOUNTER
form was picked up from  by Waqar Troncoso. ID was checked and patient  label was attached to patient  log.

## 2019-07-23 ENCOUNTER — OFFICE VISIT (OUTPATIENT)
Dept: FAMILY MEDICINE | Facility: CLINIC | Age: 59
End: 2019-07-23
Payer: COMMERCIAL

## 2019-07-23 VITALS
OXYGEN SATURATION: 98 % | TEMPERATURE: 98 F | WEIGHT: 205.6 LBS | DIASTOLIC BLOOD PRESSURE: 68 MMHG | SYSTOLIC BLOOD PRESSURE: 102 MMHG | RESPIRATION RATE: 16 BRPM | HEART RATE: 79 BPM | HEIGHT: 72 IN | BODY MASS INDEX: 27.85 KG/M2

## 2019-07-23 DIAGNOSIS — Z11.4 SCREENING FOR HIV (HUMAN IMMUNODEFICIENCY VIRUS): ICD-10-CM

## 2019-07-23 DIAGNOSIS — Z13.29 SCREENING FOR THYROID DISORDER: ICD-10-CM

## 2019-07-23 DIAGNOSIS — F17.200 TOBACCO USE DISORDER: ICD-10-CM

## 2019-07-23 DIAGNOSIS — E11.65 TYPE 2 DIABETES MELLITUS WITH HYPERGLYCEMIA, WITHOUT LONG-TERM CURRENT USE OF INSULIN (H): ICD-10-CM

## 2019-07-23 DIAGNOSIS — E78.5 HYPERLIPIDEMIA LDL GOAL <100: ICD-10-CM

## 2019-07-23 DIAGNOSIS — Z00.00 ROUTINE GENERAL MEDICAL EXAMINATION AT A HEALTH CARE FACILITY: Primary | ICD-10-CM

## 2019-07-23 DIAGNOSIS — E11.9 TYPE 2 DIABETES, HBA1C GOAL < 7% (H): ICD-10-CM

## 2019-07-23 DIAGNOSIS — Z13.220 SCREENING FOR HYPERLIPIDEMIA: ICD-10-CM

## 2019-07-23 DIAGNOSIS — I10 BENIGN ESSENTIAL HYPERTENSION: ICD-10-CM

## 2019-07-23 DIAGNOSIS — I10 HYPERTENSION GOAL BP (BLOOD PRESSURE) < 140/90: ICD-10-CM

## 2019-07-23 DIAGNOSIS — R39.11 URINARY HESITANCY: ICD-10-CM

## 2019-07-23 DIAGNOSIS — Z12.5 SCREENING FOR PROSTATE CANCER: ICD-10-CM

## 2019-07-23 LAB — HBA1C MFR BLD: 7.5 % (ref 0–5.6)

## 2019-07-23 PROCEDURE — 84443 ASSAY THYROID STIM HORMONE: CPT | Performed by: NURSE PRACTITIONER

## 2019-07-23 PROCEDURE — 82043 UR ALBUMIN QUANTITATIVE: CPT | Performed by: NURSE PRACTITIONER

## 2019-07-23 PROCEDURE — 80048 BASIC METABOLIC PNL TOTAL CA: CPT | Performed by: NURSE PRACTITIONER

## 2019-07-23 PROCEDURE — 36415 COLL VENOUS BLD VENIPUNCTURE: CPT | Performed by: NURSE PRACTITIONER

## 2019-07-23 PROCEDURE — 99396 PREV VISIT EST AGE 40-64: CPT | Performed by: NURSE PRACTITIONER

## 2019-07-23 PROCEDURE — 80061 LIPID PANEL: CPT | Performed by: NURSE PRACTITIONER

## 2019-07-23 PROCEDURE — 83036 HEMOGLOBIN GLYCOSYLATED A1C: CPT | Performed by: NURSE PRACTITIONER

## 2019-07-23 PROCEDURE — 87389 HIV-1 AG W/HIV-1&-2 AB AG IA: CPT | Performed by: NURSE PRACTITIONER

## 2019-07-23 PROCEDURE — G0103 PSA SCREENING: HCPCS | Performed by: NURSE PRACTITIONER

## 2019-07-23 ASSESSMENT — MIFFLIN-ST. JEOR: SCORE: 1785.11

## 2019-07-23 NOTE — PROGRESS NOTES
SUBJECTIVE:   CC: Waqar Troncoso is an 59 year old male who presents for preventive health visit.     Healthy Habits:    Do you get at least three servings of calcium containing foods daily (dairy, green leafy vegetables, etc.)? no    Amount of exercise or daily activities, outside of work: 0 day(s) per week    Problems taking medications regularly No    Medication side effects: No    Have you had an eye exam in the past two years? yes    Do you see a dentist twice per year? no    Do you have sleep apnea, excessive snoring or daytime drowsiness?no    Patient/Parent of patient informed that anything we discuss that is not related to preventative medicine, may be billed for; patient verbalizes understanding.    Concern(s):  1. Would like prostate labs checked      Today's PHQ-2 Score:   PHQ-2 ( 1999 [employer]) 7/23/2019 8/2/2018   Q1: Little interest or pleasure in doing things 0 0   Q2: Feeling down, depressed or hopeless 0 0   PHQ-2 Score 0 0   Q1: Little interest or pleasure in doing things - Not at all   Q2: Feeling down, depressed or hopeless - Not at all   PHQ-2 Score - 0       Abuse: Current or Past(Physical, Sexual or Emotional)- No  Do you feel safe in your environment? Yes    Social History     Tobacco Use     Smoking status: Current Every Day Smoker     Packs/day: 1.00     Years: 10.00     Pack years: 10.00     Types: Cigarettes     Smokeless tobacco: Former User     Types: Chew   Substance Use Topics     Alcohol use: Yes     Alcohol/week: 1.5 oz     Types: 3 Standard drinks or equivalent per week     Comment: 3 drinks per month     If you drink alcohol do you typically have >3 drinks per day or >7 drinks per week? No                      Last PSA: No results found for: PSA    Reviewed orders with patient. Reviewed health maintenance and updated orders accordingly - Yes  Lab work is in process  Labs reviewed in EPIC  BP Readings from Last 3 Encounters:   07/23/19 102/68   03/28/19 133/83   08/02/18 114/71     Wt Readings from Last 3 Encounters:   07/23/19 93.3 kg (205 lb 9.6 oz)   03/28/19 94.3 kg (207 lb 12.8 oz)   08/02/18 98.9 kg (218 lb)                  Patient Active Problem List   Diagnosis     Hyperlipidemia LDL goal <100     Type 2 diabetes mellitus with hyperglycemia, without long-term current use of insulin (H)     Tobacco use disorder     Advanced directives, counseling/discussion     Benign essential hypertension     Urinary hesitancy     Hypertension goal BP (blood pressure) < 140/90     Past Surgical History:   Procedure Laterality Date     COLONOSCOPY       FRACTURE TX, ANKLE RT/LT  1999    Fracture TX Ankle left, 13 surgeries per pt     HERNIA REPAIR, UMBILICAL  1995     ROTATOR CUFF REPAIR RT/LT  ~1990    right and left     VASCULAR SURGERY  >1999    reconstruction post left ankle injury       Social History     Tobacco Use     Smoking status: Current Every Day Smoker     Packs/day: 1.00     Years: 10.00     Pack years: 10.00     Types: Cigarettes     Smokeless tobacco: Former User     Types: Chew   Substance Use Topics     Alcohol use: Yes     Alcohol/week: 1.5 oz     Types: 3 Standard drinks or equivalent per week     Comment: 3 drinks per month     Family History   Problem Relation Age of Onset     Diabetes Mother      Diabetes Father      Diabetes Maternal Grandmother      Diabetes Maternal Grandfather      Diabetes Paternal Grandmother      Diabetes Paternal Grandfather      Cancer - colorectal No family hx of      Prostate Cancer No family hx of          Current Outpatient Medications   Medication Sig Dispense Refill     aspirin (ASA) 81 MG tablet Take 1 tablet (81 mg) by mouth daily 90 tablet 3     atorvastatin (LIPITOR) 80 MG tablet Take 1 tablet (80 mg) by mouth daily 90 tablet 3     glipiZIDE (GLUCOTROL XL) 10 MG 24 hr tablet Take 2 tablets (20 mg) by mouth daily 180 tablet 3     lisinopril (PRINIVIL/ZESTRIL) 10 MG tablet Take 1 tablet (10 mg) by mouth daily 90 tablet 3     metFORMIN  (GLUCOPHAGE) 1000 MG tablet Take 1 tablet (1,000 mg) by mouth 2 times daily (with meals) 180 tablet 3     Allergies   Allergen Reactions     Oxycontin [Kdc:Ci Pigment Blue 63+Oxycodone]      Sweating, hyperventilating per patient      Recent Labs   Lab Test 03/28/19  0900 08/02/18  1515 08/07/12  0837   A1C 7.8* 8.9* 6.4*   LDL  --  41 77   HDL  --  35* 34*   TRIG  --  126 170*   ALT  --  30  --    CR  --  0.78 0.78   GFRESTIMATED  --  >90 >90   GFRESTBLACK  --  >90 >90   POTASSIUM  --  4.3  --    TSH  --  4.26* 4.11        Reviewed and updated as needed this visit by clinical staff  Tobacco  Allergies  Meds  Problems  Med Hx  Surg Hx  Fam Hx  Soc Hx          Reviewed and updated as needed this visit by Provider  Tobacco  Allergies  Meds  Problems  Med Hx  Surg Hx  Fam Hx        Past Medical History:   Diagnosis Date     Benign essential hypertension 8/2/2018      Past Surgical History:   Procedure Laterality Date     COLONOSCOPY       FRACTURE TX, ANKLE RT/LT  1999    Fracture TX Ankle left, 13 surgeries per pt     HERNIA REPAIR, UMBILICAL  1995     ROTATOR CUFF REPAIR RT/LT  ~1990    right and left     VASCULAR SURGERY  >1999    reconstruction post left ankle injury       ROS:  CONSTITUTIONAL: NEGATIVE for fever, chills, change in weight  INTEGUMENTARY/SKIN: NEGATIVE for worrisome rashes, moles or lesions  EYES: NEGATIVE for vision changes or irritation  ENT: NEGATIVE for ear, mouth and throat problems  RESP: NEGATIVE for significant cough or SOB  CV: NEGATIVE for chest pain, palpitations or peripheral edema  GI: NEGATIVE for nausea, abdominal pain, heartburn, or change in bowel habits   male: negative for dysuria, hematuria, decreased urinary stream, erectile dysfunction, urethral discharge POSITIVE for difficulty starting stream first thing in the morning  MUSCULOSKELETAL: NEGATIVE for significant arthralgias or myalgia  NEURO: NEGATIVE for weakness, dizziness or paresthesias  ENDOCRINE: NEGATIVE  "for temperature intolerance, skin/hair changes  HEME/ALLERGY/IMMUNE: NEGATIVE for bleeding problems  PSYCHIATRIC: NEGATIVE for changes in mood or affect    OBJECTIVE:   /68   Pulse 79   Temp 98  F (36.7  C) (Oral)   Resp 16   Ht 1.82 m (5' 11.65\")   Wt 93.3 kg (205 lb 9.6 oz)   SpO2 98%   BMI 28.15 kg/m    EXAM:  GENERAL: healthy, alert and no distress  EYES: Eyes grossly normal to inspection, PERRL and conjunctivae and sclerae normal  HENT: ear canals and TM's normal, nose and mouth without ulcers or lesions  NECK: no adenopathy, no asymmetry, masses, or scars and thyroid normal to palpation  RESP: lungs clear to auscultation - no rales, rhonchi or wheezes  CV: regular rate and rhythm, normal S1 S2, no S3 or S4, no murmur, click or rub, no peripheral edema and peripheral pulses strong  ABDOMEN: soft, nontender, no hepatosplenomegaly, no masses and bowel sounds normal   (male): normal male genitalia without lesions or urethral discharge, no hernia  MS: no gross musculoskeletal defects noted, no edema, no CVA tenderness  SKIN: no suspicious lesions or rashes  NEURO: Normal strength and tone, mentation intact and speech normal, cranial nerves intact  PSYCH: mentation appears normal, affect normal/bright  LYMPH: no cervical, supraclavicular, axillary adenopathy    Diagnostic Test Results:  Labs reviewed in Epic  See orders    ASSESSMENT/PLAN:       ICD-10-CM    1. Routine general medical examination at a health care facility Z00.00    2. Screening for hyperlipidemia Z13.220 Lipid panel reflex to direct LDL Fasting   3. Hyperlipidemia LDL goal <100 E78.5 Lipid panel reflex to direct LDL Fasting   4. Type 2 diabetes, HbA1c goal < 7% (H) E11.9 OPHTHALMOLOGY ADULT REFERRAL     Hemoglobin A1c     Albumin Random Urine Quantitative with Creat Ratio   5. Benign essential hypertension I10 BASIC METABOLIC PANEL   6. Screening for HIV (human immunodeficiency virus) Z11.4 HIV Screening   7. Tobacco use disorder " "F17.200 TOBACCO CESSATION - FOR HEALTH MAINTENANCE   8. Screening for prostate cancer Z12.5 PSA, screen   9. Screening for thyroid disorder Z13.29 TSH with free T4 reflex   10. Type 2 diabetes mellitus with hyperglycemia, without long-term current use of insulin (H) E11.65    11. Hypertension goal BP (blood pressure) < 140/90 I10    12. Urinary hesitancy R39.11        COUNSELING:  Reviewed preventive health counseling, as reflected in patient instructions he should let me know when refills are needed    Estimated body mass index is 28.15 kg/m  as calculated from the following:    Height as of this encounter: 1.82 m (5' 11.65\").    Weight as of this encounter: 93.3 kg (205 lb 9.6 oz).    Weight management plan: Discussed healthy diet and exercise guidelines     reports that he has been smoking cigarettes.  He has a 10.00 pack-year smoking history. He has quit using smokeless tobacco. His smokeless tobacco use included chew.  Tobacco Cessation Action Plan: Information offered: Patient not interested at this time    Counseling Resources:  ATP IV Guidelines  Pooled Cohorts Equation Calculator  FRAX Risk Assessment  ICSI Preventive Guidelines  Dietary Guidelines for Americans, 2010  USDA's MyPlate  ASA Prophylaxis  Lung CA Screening    Billie Torres, RAQUEL  [facility] ANDRE  "

## 2019-07-23 NOTE — LETTER
July 26, 2019      Waqar Troncoso  [id number] [facility] [address]        Dear ,    We are writing to inform you of your test results.    Thank you for your recent office visit.     Here are your recent results. A1C has increased slightly, continue to work on diet, exercise and weight. Your urine albumin was higher than the last time. We should repeat your fasting labs 6 months. Follow up in clinic as needed.     Resulted Orders   HIV Screening   Result Value Ref Range    HIV Antigen Antibody Combo Nonreactive NR^Nonreactive          Comment:      HIV-1 p24 Ag & HIV-1/HIV-2 Ab Not Detected   BASIC METABOLIC PANEL   Result Value Ref Range    Sodium 138 133 - 144 mmol/L    Potassium 4.0 3.4 - 5.3 mmol/L    Chloride 105 94 - 109 mmol/L    Carbon Dioxide 23 20 - 32 mmol/L    Anion Gap 10 3 - 14 mmol/L    Glucose 83 70 - 99 mg/dL      Comment:      Fasting specimen    Urea Nitrogen 12 7 - 30 mg/dL    Creatinine 0.77 0.66 - 1.25 mg/dL    GFR Estimate >90 >60 mL/min/[1.73_m2]      Comment:      Non  GFR Calc  Starting 12/18/2018, serum creatinine based estimated GFR (eGFR) will be   calculated using the Chronic Kidney Disease Epidemiology Collaboration   (CKD-EPI) equation.      GFR Estimate If Black >90 >60 mL/min/[1.73_m2]      Comment:       GFR Calc  Starting 12/18/2018, serum creatinine based estimated GFR (eGFR) will be   calculated using the Chronic Kidney Disease Epidemiology Collaboration   (CKD-EPI) equation.      Calcium 9.3 8.5 - 10.1 mg/dL   Lipid panel reflex to direct LDL Fasting   Result Value Ref Range    Cholesterol 116 <200 mg/dL    Triglycerides 128 <150 mg/dL      Comment:      Fasting specimen    HDL Cholesterol 38 (L) >39 mg/dL    LDL Cholesterol Calculated 52 <100 mg/dL      Comment:      Desirable:       <100 mg/dl    Non HDL Cholesterol 78 <130 mg/dL   Hemoglobin A1c   Result Value Ref Range    Hemoglobin A1C 7.5 (H) 0 - 5.6 %      Comment:       Normal <5.7% Prediabetes 5.7-6.4%  Diabetes 6.5% or higher - adopted from ADA   consensus guidelines.     Albumin Random Urine Quantitative with Creat Ratio   Result Value Ref Range    Creatinine Urine 117 mg/dL    Albumin Urine mg/L 31 mg/L    Albumin Urine mg/g Cr 26.24 (H) 0 - 17 mg/g Cr   PSA, screen   Result Value Ref Range    PSA 0.65 0 - 4 ug/L      Comment:      Assay Method:  Chemiluminescence using Siemens Vista analyzer   TSH with free T4 reflex   Result Value Ref Range    TSH 2.79 0.40 - 4.00 mU/L       If you have any questions or concerns, please call the clinic at the number listed above.       Sincerely,        Billie Torres NP/melodyo

## 2019-07-23 NOTE — PATIENT INSTRUCTIONS
Reminders: If you are signed up for Where please be aware your results and communications will be sent within your Prevot[facility]hart. Please remember to arrive 5-10 minutes early for your appointments. If you are late you may need to reschedule your appointment.    Preventive Health Recommendations  Male Ages 50 - 64    Yearly exam:             See your health care provider every year in order to  o   Review health changes.   o   Discuss preventive care.    o   Review your medicines if your doctor has prescribed any.     Have a cholesterol test every 5 years, or more frequently if you are at risk for high cholesterol/heart disease.     Have a diabetes test (fasting glucose) every three years. If you are at risk for diabetes, you should have this test more often.     Have a colonoscopy at age 50, or have a yearly FIT test (stool test). These exams will check for colon cancer.      Talk with your health care provider about whether or not a prostate cancer screening test (PSA) is right for you.    You should be tested each year for STDs (sexually transmitted diseases), if you re at risk.     Shots: Get a flu shot each year. Get a tetanus shot every 10 years.     Nutrition:    Eat at least 5 servings of fruits and vegetables daily.     Eat whole-grain bread, whole-wheat pasta and brown rice instead of white grains and rice.     Get adequate Calcium and Vitamin D.     Lifestyle    Exercise for at least 150 minutes a week (30 minutes a day, 5 days a week). This will help you control your weight and prevent disease.     Limit alcohol to one drink per day.     No smoking.     Wear sunscreen to prevent skin cancer.     See your dentist every six months for an exam and cleaning.     See your eye doctor every 1 to 2 years.

## 2019-07-24 LAB
ANION GAP SERPL CALCULATED.3IONS-SCNC: 10 MMOL/L (ref 3–14)
BUN SERPL-MCNC: 12 MG/DL (ref 7–30)
CALCIUM SERPL-MCNC: 9.3 MG/DL (ref 8.5–10.1)
CHLORIDE SERPL-SCNC: 105 MMOL/L (ref 94–109)
CHOLEST SERPL-MCNC: 116 MG/DL
CO2 SERPL-SCNC: 23 MMOL/L (ref 20–32)
CREAT SERPL-MCNC: 0.77 MG/DL (ref 0.66–1.25)
CREAT UR-MCNC: 117 MG/DL
GFR SERPL CREATININE-BSD FRML MDRD: >90 ML/MIN/{1.73_M2}
GLUCOSE SERPL-MCNC: 83 MG/DL (ref 70–99)
HDLC SERPL-MCNC: 38 MG/DL
HIV 1+2 AB+HIV1 P24 AG SERPL QL IA: NONREACTIVE
LDLC SERPL CALC-MCNC: 52 MG/DL
MICROALBUMIN UR-MCNC: 31 MG/L
MICROALBUMIN/CREAT UR: 26.24 MG/G CR (ref 0–17)
NONHDLC SERPL-MCNC: 78 MG/DL
POTASSIUM SERPL-SCNC: 4 MMOL/L (ref 3.4–5.3)
PSA SERPL-ACNC: 0.65 UG/L (ref 0–4)
SODIUM SERPL-SCNC: 138 MMOL/L (ref 133–144)
TRIGL SERPL-MCNC: 128 MG/DL
TSH SERPL DL<=0.005 MIU/L-ACNC: 2.79 MU/L (ref 0.4–4)

## 2019-07-25 NOTE — RESULT ENCOUNTER NOTE
Tucker Zavaleta,    Thank you for your recent office visit.    Here are your recent results.  A1C has increased slightly, continue to work on diet, exercise and weight.  Your urine albumin was higher than the last time.  We should repeat your fasting labs 6 months. Follow up in clinic as needed.     Feel free to contact me via Motribe or call the clinic at [phone].    Sincerely,    Billie Torres, NEREYDA, FNP-BC

## 2019-07-26 ENCOUNTER — TRANSFERRED RECORDS (OUTPATIENT)
Dept: HEALTH INFORMATION MANAGEMENT | Facility: CLINIC | Age: 59
End: 2019-07-26

## 2019-08-05 ENCOUNTER — TELEPHONE (OUTPATIENT)
Dept: FAMILY MEDICINE | Facility: CLINIC | Age: 59
End: 2019-08-05

## 2019-08-05 NOTE — TELEPHONE ENCOUNTER
Panel Management Review  Summary:    Patient is due/failing the following:   Tobacco cessation done at 7/23/19 visit-patient not interested in quitting   Eye exam-please ask if patient has completed or needs a referral    Type of outreach:    Phone, left message for patient to call back.                                                                                                                    Marybel Gtz MA    Chart routed to Care Team .

## 2019-08-08 NOTE — TELEPHONE ENCOUNTER
Spoke with pt, he will call and see when his last eye exam was and let us know or nanette and have report sent to us. Marybel Gtz MA

## 2019-08-25 ENCOUNTER — TRANSFERRED RECORDS (OUTPATIENT)
Dept: HEALTH INFORMATION MANAGEMENT | Facility: CLINIC | Age: 59
End: 2019-08-25

## 2019-09-03 ENCOUNTER — OFFICE VISIT (OUTPATIENT)
Dept: FAMILY MEDICINE | Facility: CLINIC | Age: 59
End: 2019-09-03
Payer: OTHER MISCELLANEOUS

## 2019-09-03 VITALS
RESPIRATION RATE: 18 BRPM | HEIGHT: 73 IN | BODY MASS INDEX: 27.83 KG/M2 | OXYGEN SATURATION: 98 % | TEMPERATURE: 98.2 F | SYSTOLIC BLOOD PRESSURE: 122 MMHG | DIASTOLIC BLOOD PRESSURE: 77 MMHG | WEIGHT: 210 LBS | HEART RATE: 84 BPM

## 2019-09-03 DIAGNOSIS — Z01.818 PREOP GENERAL PHYSICAL EXAM: Primary | ICD-10-CM

## 2019-09-03 DIAGNOSIS — I10 BENIGN ESSENTIAL HYPERTENSION: ICD-10-CM

## 2019-09-03 LAB
ANION GAP SERPL CALCULATED.3IONS-SCNC: 7 MMOL/L (ref 3–14)
BASOPHILS # BLD AUTO: 0 10E9/L (ref 0–0.2)
BASOPHILS NFR BLD AUTO: 0.3 %
BUN SERPL-MCNC: 13 MG/DL (ref 7–30)
CALCIUM SERPL-MCNC: 9.1 MG/DL (ref 8.5–10.1)
CHLORIDE SERPL-SCNC: 108 MMOL/L (ref 94–109)
CO2 SERPL-SCNC: 26 MMOL/L (ref 20–32)
CREAT SERPL-MCNC: 0.77 MG/DL (ref 0.66–1.25)
DIFFERENTIAL METHOD BLD: NORMAL
EOSINOPHIL # BLD AUTO: 0.2 10E9/L (ref 0–0.7)
EOSINOPHIL NFR BLD AUTO: 2.4 %
ERYTHROCYTE [DISTWIDTH] IN BLOOD BY AUTOMATED COUNT: 13.7 % (ref 10–15)
GFR SERPL CREATININE-BSD FRML MDRD: >90 ML/MIN/{1.73_M2}
GLUCOSE SERPL-MCNC: 252 MG/DL (ref 70–99)
HCT VFR BLD AUTO: 42.4 % (ref 40–53)
HGB BLD-MCNC: 14.1 G/DL (ref 13.3–17.7)
LYMPHOCYTES # BLD AUTO: 1.6 10E9/L (ref 0.8–5.3)
LYMPHOCYTES NFR BLD AUTO: 21 %
MCH RBC QN AUTO: 29.9 PG (ref 26.5–33)
MCHC RBC AUTO-ENTMCNC: 33.3 G/DL (ref 31.5–36.5)
MCV RBC AUTO: 90 FL (ref 78–100)
MONOCYTES # BLD AUTO: 0.5 10E9/L (ref 0–1.3)
MONOCYTES NFR BLD AUTO: 7.1 %
NEUTROPHILS # BLD AUTO: 5.2 10E9/L (ref 1.6–8.3)
NEUTROPHILS NFR BLD AUTO: 69.2 %
PLATELET # BLD AUTO: 164 10E9/L (ref 150–450)
POTASSIUM SERPL-SCNC: 4.2 MMOL/L (ref 3.4–5.3)
RBC # BLD AUTO: 4.72 10E12/L (ref 4.4–5.9)
SODIUM SERPL-SCNC: 141 MMOL/L (ref 133–144)
WBC # BLD AUTO: 7.5 10E9/L (ref 4–11)

## 2019-09-03 PROCEDURE — 36415 COLL VENOUS BLD VENIPUNCTURE: CPT | Performed by: PHYSICIAN ASSISTANT

## 2019-09-03 PROCEDURE — 80048 BASIC METABOLIC PNL TOTAL CA: CPT | Performed by: PHYSICIAN ASSISTANT

## 2019-09-03 PROCEDURE — 99214 OFFICE O/P EST MOD 30 MIN: CPT | Performed by: PHYSICIAN ASSISTANT

## 2019-09-03 PROCEDURE — 85025 COMPLETE CBC W/AUTO DIFF WBC: CPT | Performed by: PHYSICIAN ASSISTANT

## 2019-09-03 PROCEDURE — 93000 ELECTROCARDIOGRAM COMPLETE: CPT | Performed by: PHYSICIAN ASSISTANT

## 2019-09-03 ASSESSMENT — MIFFLIN-ST. JEOR: SCORE: 1826.43

## 2019-09-03 NOTE — PROGRESS NOTES
[facility]  [address]  [place] [address]  [phone]  Dept: [phone]    PRE-OP EVALUATION:  Today's date: 9/3/2019    Waqar Troncoso (: 1960) presents for pre-operative evaluation assessment as requested by  .  He requires evaluation and anesthesia risk assessment prior to undergoing surgery/procedure for treatment of arthroscopy right rotator cuff and bicep .    Proposed Surgery/ Procedure: arthroscopy right rotator cuff and bicep  Date of Surgery/ Procedure: 19  Time of Surgery/ Procedure: 8am  Hospital/Surgical Facility: [facility]  Fax number for surgical facility: [phone]  Primary Physician: Billie Torres  Type of Anesthesia Anticipated: to be determined    Patient has a Health Care Directive or Living Will:  NO    1. NO - Do you have a history of heart attack, stroke, stent, bypass or surgery on an artery in the head, neck, heart or legs?  2. NO - Do you ever have any pain or discomfort in your chest?  3. NO - Do you have a history of  Heart Failure?  4. NO - Are you troubled by shortness of breath when: walking on the level, up a slight hill or at night?  5. NO - Do you currently have a cold, bronchitis or other respiratory infection?  6. NO - Do you have a cough, shortness of breath or wheezing?  7. NO - Do you sometimes get pains in the calves of your legs when you walk?  8. YES - DO YOU OR ANYONE IN YOUR FAMILY HAVE PREVIOUS HISTORY OF BLOOD CLOTS? mother  9. NO - Do you or does anyone in your family have a serious bleeding problem such as prolonged bleeding following surgeries or cuts?  10. NO - Have you ever had problems with anemia or been told to take iron pills?  11. NO - Have you had any abnormal blood loss such as black, tarry or bloody stools, or abnormal vaginal bleeding?  12. NO - Have you ever had a blood transfusion?  13. YES - HAVE YOU OR ANY OF YOUR RELATIVES EVER HAD PROBLEMS WITH ANESTHESIA? Dad  (nausea/vomiting)  14. NO - Do you have sleep apnea, excessive snoring or daytime drowsiness?  15. NO - Do you have any prosthetic heart valves?  16. NO - Do you have prosthetic joints?  17. NO - Is there any chance that you may be pregnant?      HPI:     HPI related to upcoming procedure: right shoulder injury -rotator cuff tear and bicep tendon tear.       See problem list for active medical problems.  Problems all longstanding and stable, except as noted/documented.  See ROS for pertinent symptoms related to these conditions.      MEDICAL HISTORY:     Patient Active Problem List    Diagnosis Date Noted     Urinary hesitancy 07/23/2019     Priority: Medium     Hypertension goal BP (blood pressure) < 140/90 07/23/2019     Priority: Medium     Advanced directives, counseling/discussion 08/02/2018     Priority: Medium     Benign essential hypertension 08/02/2018     Priority: Medium     Hyperlipidemia LDL goal <100 08/07/2012     Priority: Medium     Type 2 diabetes mellitus with hyperglycemia, without long-term current use of insulin (H) 08/07/2012     Priority: Medium     Diagnosis 2012       Tobacco use disorder 08/07/2012     Priority: Medium     Quit 3 months 2011 via [facility] study, restarted due to stress        Past Medical History:   Diagnosis Date     Benign essential hypertension 8/2/2018     Past Surgical History:   Procedure Laterality Date     COLONOSCOPY       FRACTURE TX, ANKLE RT/LT  1999    Fracture TX Ankle left, 13 surgeries per pt     HERNIA REPAIR, UMBILICAL  1995     ROTATOR CUFF REPAIR RT/LT  ~1990    right and left     VASCULAR SURGERY  >1999    reconstruction post left ankle injury     Current Outpatient Medications   Medication Sig Dispense Refill     aspirin (ASA) 81 MG tablet Take 1 tablet (81 mg) by mouth daily 90 tablet 3     atorvastatin (LIPITOR) 80 MG tablet Take 1 tablet (80 mg) by mouth daily 90 tablet 3     glipiZIDE (GLUCOTROL XL) 10 MG 24 hr tablet Take 2 tablets  "(20 mg) by mouth daily 180 tablet 3     lisinopril (PRINIVIL/ZESTRIL) 10 MG tablet Take 1 tablet (10 mg) by mouth daily 90 tablet 3     metFORMIN (GLUCOPHAGE) 1000 MG tablet Take 1 tablet (1,000 mg) by mouth 2 times daily (with meals) 180 tablet 3     OTC products: None, except as noted above    Allergies   Allergen Reactions     Oxycontin [Kdc:Ci Pigment Blue 63+Oxycodone]      Sweating, hyperventilating per patient       Latex Allergy: NO    Social History     Tobacco Use     Smoking status: Current Every Day Smoker     Packs/day: 1.00     Years: 10.00     Pack years: 10.00     Types: Cigarettes     Smokeless tobacco: Former User     Types: Chew   Substance Use Topics     Alcohol use: Yes     Alcohol/week: 1.5 oz     Types: 3 Standard drinks or equivalent per week     Comment: 3 drinks per month     History   Drug Use No       REVIEW OF SYSTEMS:   Constitutional, neuro, ENT, endocrine, pulmonary, cardiac, gastrointestinal, genitourinary, musculoskeletal, integument and psychiatric systems are negative, except as otherwise noted.    EXAM:   /77   Pulse 84   Temp 98.2  F (36.8  C) (Tympanic)   Resp 18   Ht 1.854 m (6' 1\")   Wt 95.3 kg (210 lb)   SpO2 98%   BMI 27.71 kg/m      GENERAL APPEARANCE: healthy, alert and no distress     EYES: EOMI,  PERRL     HENT: ear canals and TM's normal and nose and mouth without ulcers or lesions     NECK: no adenopathy, no asymmetry, masses, or scars and thyroid normal to palpation     RESP: lungs clear to auscultation - no rales, rhonchi or wheezes     CV: regular rates and rhythm, normal S1 S2, no S3 or S4 and no murmur, click or rub     ABDOMEN:  soft, nontender, no HSM or masses and bowel sounds normal     SKIN: no suspicious lesions or rashes     NEURO: Normal strength and tone, sensory exam grossly normal, mentation intact and speech normal     PSYCH: mentation appears normal. and affect normal/bright     LYMPHATICS: No cervical adenopathy    DIAGNOSTICS:   EKG: " appears normal, NSR, normal axis, normal intervals, no acute ST/T changes c/w ischemia, no LVH by voltage criteria, unchanged from previous tracings    Recent Labs   Lab Test 07/23/19  1730 03/28/19  0900 08/02/18  1515     --  141   POTASSIUM 4.0  --  4.3   CR 0.77  --  0.78   A1C 7.5* 7.8* 8.9*        IMPRESSION:       The proposed surgical procedure is considered INTERMEDIATE risk.    REVISED CARDIAC RISK INDEX  The patient has the following serious cardiovascular risks for perioperative complications such as (MI, PE, VFib and 3  AV Block):  No serious cardiac risks  INTERPRETATION: 2 risks: Class III (moderate risk - 6.6% complication rate)    The patient has the following additional risks for perioperative complications:  No identified additional risks      ICD-10-CM    1. Preop general physical exam Z01.818 EKG 12-lead complete w/read - Clinics     CBC with platelets and differential   2. Benign essential hypertension I10 EKG 12-lead complete w/read - Clinics       RECOMMENDATIONS:         --Patient is to hold all scheduled medications on the morning of his surgery    APPROVAL GIVEN to proceed with proposed procedure, without further diagnostic evaluation       Signed Electronically by: Tip Romero PA-C    Copy of this evaluation report is provided to requesting physician.    [place] Preop Guidelines    Revised Cardiac Risk Index

## 2019-10-28 ENCOUNTER — TELEPHONE (OUTPATIENT)
Dept: FAMILY MEDICINE | Facility: CLINIC | Age: 59
End: 2019-10-28

## 2019-10-28 NOTE — TELEPHONE ENCOUNTER
Panel Management Review  Summary:    Patient is due/failing the following:   Patient failing diabetes due to being a smoker. Tobacco cessation done 7/23/19  Eye exam sent to abstraction  Health Maintenance Due   Topic Date Due     ZOSTER IMMUNIZATION (1 of 2) 03/28/2011     EYE EXAM  07/10/2018     INFLUENZA VACCINE (1) 09/01/2019        Type of outreach:    none needed                                                                                                                   Marybel Gtz    Chart routed to Care Team .

## 2019-12-31 ENCOUNTER — OFFICE VISIT (OUTPATIENT)
Dept: PODIATRY | Facility: CLINIC | Age: 59
End: 2019-12-31
Payer: COMMERCIAL

## 2019-12-31 VITALS
DIASTOLIC BLOOD PRESSURE: 82 MMHG | SYSTOLIC BLOOD PRESSURE: 138 MMHG | WEIGHT: 215 LBS | BODY MASS INDEX: 28.37 KG/M2 | HEART RATE: 76 BPM

## 2019-12-31 DIAGNOSIS — L84 CALLUS: ICD-10-CM

## 2019-12-31 DIAGNOSIS — B35.1 ONYCHOMYCOSIS: Primary | ICD-10-CM

## 2019-12-31 PROCEDURE — 36415 COLL VENOUS BLD VENIPUNCTURE: CPT | Performed by: PODIATRIST

## 2019-12-31 PROCEDURE — 99203 OFFICE O/P NEW LOW 30 MIN: CPT | Mod: 25 | Performed by: PODIATRIST

## 2019-12-31 PROCEDURE — 11720 DEBRIDE NAIL 1-5: CPT | Mod: 51 | Performed by: PODIATRIST

## 2019-12-31 PROCEDURE — 80076 HEPATIC FUNCTION PANEL: CPT | Performed by: PODIATRIST

## 2019-12-31 PROCEDURE — 11055 PARING/CUTG B9 HYPRKER LES 1: CPT | Performed by: PODIATRIST

## 2019-12-31 RX ORDER — TERBINAFINE HYDROCHLORIDE 250 MG/1
250 TABLET ORAL DAILY
Qty: 30 TABLET | Refills: 2 | Status: SHIPPED | OUTPATIENT
Start: 2019-12-31 | End: 2021-07-20

## 2019-12-31 NOTE — LETTER
12/31/2019         RE: Waqar Troncoso  [address] [facility] [address]        Dear Colleague,    Thank you for referring your patient, Waqar Troncoso, to the [facility]. Please see a copy of my visit note below.    Patient complains of thick left toenails(s) .  Has had this for 1 years.  It is slowly getting worse.  Has had pain in the past which is aggravated by activity and relieved by rest.  Tried over the counter medications without success.  Does not like the look of the nail and is wondering about options.  Patient denies heavy OH use.  Rarely drinks and he is a .   Patient takes Lipitor.   Patient does have other family member with this problem.  Patient has diabetes.  Callus left foot sub fourth met had for last 2 years.  Both of his parents had diabetes.        ROS:  A 10-point review of systems was performed and is positive for that noted in the HPI and as seen below.  All other areas are negative.          Allergies   Allergen Reactions     Oxycontin [Kdc:Ci Pigment Blue 63+Oxycodone]      Sweating, hyperventilating per patient        Current Outpatient Medications   Medication Sig Dispense Refill     aspirin (ASA) 81 MG tablet Take 1 tablet (81 mg) by mouth daily 90 tablet 3     atorvastatin (LIPITOR) 80 MG tablet Take 1 tablet (80 mg) by mouth daily 90 tablet 3     glipiZIDE (GLUCOTROL XL) 10 MG 24 hr tablet Take 2 tablets (20 mg) by mouth daily 180 tablet 3     lisinopril (PRINIVIL/ZESTRIL) 10 MG tablet Take 1 tablet (10 mg) by mouth daily 90 tablet 3     metFORMIN (GLUCOPHAGE) 1000 MG tablet Take 1 tablet (1,000 mg) by mouth 2 times daily (with meals) 180 tablet 3     terbinafine (LAMISIL) 250 MG tablet Take 1 tablet (250 mg) by mouth daily 30 tablet 2       Patient Active Problem List   Diagnosis     Hyperlipidemia LDL goal <100     Type 2 diabetes mellitus with hyperglycemia, without long-term current use of insulin (H)     Tobacco use  disorder     Advanced directives, counseling/discussion     Benign essential hypertension     Urinary hesitancy     Hypertension goal BP (blood pressure) < 140/90     Peripheral neuritis     Overweight     Impotence     Arthritis       Past Medical History:   Diagnosis Date     Arthritis 2/19/2007     Benign essential hypertension 8/2/2018       Past Surgical History:   Procedure Laterality Date     COLONOSCOPY       FRACTURE TX, ANKLE RT/LT  1999    Fracture TX Ankle left, 13 surgeries per pt     HERNIA REPAIR, UMBILICAL  1995     ROTATOR CUFF REPAIR RT/LT  ~1990    right and left     VASCULAR SURGERY  >1999    reconstruction post left ankle injury       Family History   Problem Relation Age of Onset     Diabetes Mother      Diabetes Father      Diabetes Maternal Grandmother      Diabetes Maternal Grandfather      Diabetes Paternal Grandmother      Diabetes Paternal Grandfather      Cancer - colorectal No family hx of      Prostate Cancer No family hx of        Social History     Tobacco Use     Smoking status: Current Every Day Smoker     Packs/day: 1.00     Years: 10.00     Pack years: 10.00     Types: Cigarettes     Smokeless tobacco: Former User     Types: Chew   Substance Use Topics     Alcohol use: Yes     Alcohol/week: 2.5 standard drinks     Types: 3 Standard drinks or equivalent per week     Comment: 3 drinks per month         /82 (BP Location: Right arm)   Pulse 76   Wt 97.5 kg (215 lb)   BMI 28.37 kg/m   .      VConstitutional/ general:  Pt is in no apparent distress, appears well-nourished.  Cooperative with history and physical exam.     Psych:  The patient answered questions appropriately.  Normal affect.  Seems to have reasonable expectations, in terms of treatment.    Eyes:  Visual scanning/ tracking without deficit.    Ears:  Response to auditory stimuli is normal.  negative hearing aid devices.  Auricles in proper alignment.     Lymphatic:  Popliteal lymph nodes not enlarged.     Lungs:   Non labored breathing, non labored speech. No cough.  No audible wheezing. Even, quiet breathing.       Vascular:  Pedal pulses are palpable bilaterally for both the DP and PT arteries.  CFT < 3 sec.  No edema.  Pedal hair growth noted.     Neuro:  Alert and oriented x 3. Coordinated gait.  Light touch sensation is intact to the L4, L5, S1 distributions. No obvious deficits.  No evidence of neurological-based weakness, spasticity, or contracture in the lower extremities.    Derm: Normal texture and turgor.  No erythema, ecchymosis, or cyanosis.  No open lesions.  Left x 5 toenails(s)  thickened, elongated, discolored, with subungual debris.  No erythema, edema, drainage, pain on palpation.  No signs of subungual masses or exostosis.  Callus left foot sub-fourth metatarsal head.  Underlying tissue healthy    Musculoskeletal:    Lower extremity muscle strength is normal.  Patient is ambulatory without an assistive device or brace.  No gross deformities.  MS 5/5 all compartments.  Normal arch with weightbearing.         A/P  Onychomycosis          Type 2 diabetes mellitus          Callus    Discussed all options with patient regarding fungal nails.  Would like to try lamisil.  Risks, complications, and efficacy of going on this pill were discussed with the patient.  Will start lamisil 250mg, dispense 30, one per oral every day.  Two refils.  Will get LFTs today for baseline.  RTC in 3 months.  Callus debrided with a fifteen blade.  Explained cause of this to patient.  He will keep this down with a pumice stone.  Discussed stiff shoes to keep this offloaded.      Wally Mccormick DPM DPM, FACFAS                  Again, thank you for allowing me to participate in the care of your patient.        Sincerely,        Wally Mccormick DPM

## 2019-12-31 NOTE — PROGRESS NOTES
Patient complains of thick left toenails(s) .  Has had this for 1 years.  It is slowly getting worse.  Has had pain in the past which is aggravated by activity and relieved by rest.  Tried over the counter medications without success.  Does not like the look of the nail and is wondering about options.  Patient denies heavy OH use.  Rarely drinks and he is a .   Patient takes Lipitor.   Patient does have other family member with this problem.  Patient has diabetes.  Callus left foot sub fourth met had for last 2 years.  Both of his parents had diabetes.        ROS:  A 10-point review of systems was performed and is positive for that noted in the HPI and as seen below.  All other areas are negative.          Allergies   Allergen Reactions     Oxycontin [Kdc:Ci Pigment Blue 63+Oxycodone]      Sweating, hyperventilating per patient        Current Outpatient Medications   Medication Sig Dispense Refill     aspirin (ASA) 81 MG tablet Take 1 tablet (81 mg) by mouth daily 90 tablet 3     atorvastatin (LIPITOR) 80 MG tablet Take 1 tablet (80 mg) by mouth daily 90 tablet 3     glipiZIDE (GLUCOTROL XL) 10 MG 24 hr tablet Take 2 tablets (20 mg) by mouth daily 180 tablet 3     lisinopril (PRINIVIL/ZESTRIL) 10 MG tablet Take 1 tablet (10 mg) by mouth daily 90 tablet 3     metFORMIN (GLUCOPHAGE) 1000 MG tablet Take 1 tablet (1,000 mg) by mouth 2 times daily (with meals) 180 tablet 3     terbinafine (LAMISIL) 250 MG tablet Take 1 tablet (250 mg) by mouth daily 30 tablet 2       Patient Active Problem List   Diagnosis     Hyperlipidemia LDL goal <100     Type 2 diabetes mellitus with hyperglycemia, without long-term current use of insulin (H)     Tobacco use disorder     Advanced directives, counseling/discussion     Benign essential hypertension     Urinary hesitancy     Hypertension goal BP (blood pressure) < 140/90     Peripheral neuritis     Overweight     Impotence     Arthritis       Past Medical History:    Diagnosis Date     Arthritis 2/19/2007     Benign essential hypertension 8/2/2018       Past Surgical History:   Procedure Laterality Date     COLONOSCOPY       FRACTURE TX, ANKLE RT/LT  1999    Fracture TX Ankle left, 13 surgeries per pt     HERNIA REPAIR, UMBILICAL  1995     ROTATOR CUFF REPAIR RT/LT  ~1990    right and left     VASCULAR SURGERY  >1999    reconstruction post left ankle injury       Family History   Problem Relation Age of Onset     Diabetes Mother      Diabetes Father      Diabetes Maternal Grandmother      Diabetes Maternal Grandfather      Diabetes Paternal Grandmother      Diabetes Paternal Grandfather      Cancer - colorectal No family hx of      Prostate Cancer No family hx of        Social History     Tobacco Use     Smoking status: Current Every Day Smoker     Packs/day: 1.00     Years: 10.00     Pack years: 10.00     Types: Cigarettes     Smokeless tobacco: Former User     Types: Chew   Substance Use Topics     Alcohol use: Yes     Alcohol/week: 2.5 standard drinks     Types: 3 Standard drinks or equivalent per week     Comment: 3 drinks per month         /82 (BP Location: Right arm)   Pulse 76   Wt 97.5 kg (215 lb)   BMI 28.37 kg/m  .      VConstitutional/ general:  Pt is in no apparent distress, appears well-nourished.  Cooperative with history and physical exam.     Psych:  The patient answered questions appropriately.  Normal affect.  Seems to have reasonable expectations, in terms of treatment.    Eyes:  Visual scanning/ tracking without deficit.    Ears:  Response to auditory stimuli is normal.  negative hearing aid devices.  Auricles in proper alignment.     Lymphatic:  Popliteal lymph nodes not enlarged.     Lungs:  Non labored breathing, non labored speech. No cough.  No audible wheezing. Even, quiet breathing.       Vascular:  Pedal pulses are palpable bilaterally for both the DP and PT arteries.  CFT < 3 sec.  No edema.  Pedal hair growth noted.     Neuro:  Alert and  oriented x 3. Coordinated gait.  Light touch sensation is intact to the L4, L5, S1 distributions. No obvious deficits.  No evidence of neurological-based weakness, spasticity, or contracture in the lower extremities.    Derm: Normal texture and turgor.  No erythema, ecchymosis, or cyanosis.  No open lesions.  Left x 5 toenails(s)  thickened, elongated, discolored, with subungual debris.  No erythema, edema, drainage, pain on palpation.  No signs of subungual masses or exostosis.  Callus left foot sub-fourth metatarsal head.  Underlying tissue healthy    Musculoskeletal:    Lower extremity muscle strength is normal.  Patient is ambulatory without an assistive device or brace.  No gross deformities.  MS 5/5 all compartments.  Normal arch with weightbearing.         A/P  Onychomycosis          Type 2 diabetes mellitus          Callus    Discussed all options with patient regarding fungal nails.  Would like to try lamisil.  Risks, complications, and efficacy of going on this pill were discussed with the patient.  Will start lamisil 250mg, dispense 30, one per oral every day.  Two refils.  Will get LFTs today for baseline.  RTC in 3 months.  Callus debrided with a fifteen blade.  Explained cause of this to patient.  He will keep this down with a pumice stone.  Discussed stiff shoes to keep this offloaded.      Wally Mccormick DPM DPM, FACFAS

## 2019-12-31 NOTE — PATIENT INSTRUCTIONS
Weight management plan: Patient was referred to their PCP to discuss a diet and exercise plan.  We wish you continued good healing. If you have any questions or concerns, please do not hesitate to contact us at [phone]    Please remember to call and schedule a follow up appointment if one was recommended at your earliest convenience.   PODIATRY CLINIC HOURS  TELEPHONE NUMBER    Dr. Wally Mccormick D.P.M [facility]    Clinics:  [facility]    [facility] Montane[facility]   Tuesday 1PM-6PM  [place]  Wednesday 7AM-2PM  [facility]  Thursday 10AM-6PM  [place]  Friday 7AM-3PM  [place]  Specialty schedulers:   [phone] to make an appointment with any Specialty Provider.        Urgent Care locations:    [facility] Monday-Friday 5 pm - 9 pm. Saturday-Sunday 9 am -5pm    Monday-Friday 11 am - 9 pm Saturday 9 am - 5 pm     Monday-Sunday 12 noon-8PM [phone]     If you need a medication refill, please contact us you may need lab work and/or a follow up visit prior to your refill (i.e. Antifungal medications).    [facility] (secure e-mail communication and access to your chart) to send a message or to make an appointment.    If MRI needed please call [facility] at [phone]        SMOKING CESSATION  What's in cigarette smoke? - Cigarette smoke contains over 4,000 chemicals. Nicotine is one of the main ingredients which is an insecticide/herbicide. It is poisonous to our nervous system, increases blood clotting risk, and decreases the body's defenses to fight off infection. Another chemical is Carbon Monoxide is an asphyxiating gas that permanently binds to blood cells and blocks the transport of oxygen. This leads to tissue death and decreases your metabolism. Tar is a chemical that coats your lungs and trachea which impairs new oxygen coming in and carbon dioxide getting out  of your body.   How does smoking impact surgery? - Smoking is particularly hazardous with regards to surgery. Surgery puts stress on the body and a smoker's body is already under strain from these chemicals. Putting the two together, especially for an elective surgery, could be a recipe for disaster. Smoking before and after surgery increases your risk of heart problems, slow wound healing, delayed bone healing, blood clots, wound infection and anesthesia complications.   What are the benefits of quitting? - In 20 minutes your blood pressure will drop back down to normal. In 8 hours the carbon monoxide (a toxic gas) levels in your blood stream will drop by half, and oxygen levels will return to normal. In 48 hours your chance of having a heart attack will have decreased. All nicotine will have left your body. Your sense of taste and smell will return to a normal level. In 72 hours your bronchial tubes will relax, and your energy levels will increase. In 2 weeks your circulation will increase, and it will continue to improve for the next 10 weeks.    Recommendations for elective surgery - Ideally, patients should quit smoking 8 weeks before and at least 2 weeks after elective surgery in order to avoid complications. Simply cutting back on the amount of cigarettes smoked per day does not offer any benefit or decrease the risk of poor wound healing, heart problems, and infection. Smokers should also start taking Vitamin C and B for two weeks before surgery and two weeks after surgery.    Ways to Stop Smokin. Nicotine patches, lozenges, or gum  2. Support Groups  3. Medications (see below)    List of Medications:  1. Varenicline Tartrate (CHANTIX)   2. Bupropion HCL (WELLBUTRIN, ZYBAN) - note: make sure Wellbutrin is for smoking cessation and not other issues   3. Nicotine Patch (NICODERM)   4. Nicotine Inhaler (NICOTROL)   5. Nicotine Gum Nicotine Polacrilex   6. Nicotine Lozenge: Nicotine Polacrilex (COMMIT)    * [facility] offers a smoking support group as well!  Please visit: [email or web address]  If you are interested in these, ask about getting a prescription or talk to your primary care doctor about what may be the best way for you to quit.       Waqar to follow up with Primary Care provider regarding elevated blood pressure.

## 2020-01-02 LAB
ALBUMIN SERPL-MCNC: 4.5 G/DL (ref 3.4–5)
ALP SERPL-CCNC: 82 U/L (ref 40–150)
ALT SERPL W P-5'-P-CCNC: 41 U/L (ref 0–70)
AST SERPL W P-5'-P-CCNC: 17 U/L (ref 0–45)
BILIRUB DIRECT SERPL-MCNC: 0.2 MG/DL (ref 0–0.2)
BILIRUB SERPL-MCNC: 0.9 MG/DL (ref 0.2–1.3)
PROT SERPL-MCNC: 7.4 G/DL (ref 6.8–8.8)

## 2020-03-13 DIAGNOSIS — E11.65 TYPE 2 DIABETES MELLITUS WITH HYPERGLYCEMIA, WITHOUT LONG-TERM CURRENT USE OF INSULIN (H): ICD-10-CM

## 2020-03-13 NOTE — TELEPHONE ENCOUNTER
"Requested Prescriptions   Pending Prescriptions Disp Refills     metFORMIN (GLUCOPHAGE) 1000 MG tablet [Pharmacy Med Name: metFORMIN HCl Oral Tablet 1000 MG] 180 tablet 2     Sig: TAKE 1 TABLET BY MOUTH TWICE DAILY WITH MEALS  Last Written Prescription Date:  3/13/20  Last Fill Quantity: 180,  # refills: 2   Last office visit: 9/3/2019 with prescribing provider:  DANIELA Romero   Future Office Visit:         Biguanide Agents Failed - 3/13/2020  4:15 PM        Failed - Patient has documented A1c within the specified period of time.     If HgbA1C is 8 or greater, it needs to be on file within the past 3 months.  If less than 8, must be on file within the past 6 months.     Recent Labs   Lab Test 07/23/19  1730   A1C 7.5*             Failed - Recent (6 mo) or future (30 days) visit within the authorizing provider's specialty     Patient had office visit in the last 6 months or has a visit in the next 30 days with authorizing provider or within the authorizing provider's specialty.  See \"Patient Info\" tab in inbasket, or \"Choose Columns\" in Meds & Orders section of the refill encounter.            Passed - Blood pressure less than 140/90 in past 6 months     BP Readings from Last 3 Encounters:   12/31/19 138/82   09/03/19 122/77   07/23/19 102/68                 Passed - Patient has documented LDL within the past 12 mos.     Recent Labs   Lab Test 07/23/19  1730   LDL 52             Passed - Patient has had a Microalbumin in the past 15 mos.     Recent Labs   Lab Test 07/23/19  1735   MICROL 31   UMALCR 26.24*             Passed - Patient is age 10 or older        Passed - Patient's CR is NOT>1.4 OR Patient's EGFR is NOT<45 within past 12 mos.     Recent Labs   Lab Test 09/03/19  1310   GFRESTIMATED >90   GFRESTBLACK >90       Recent Labs   Lab Test 09/03/19  1310   CR 0.77             Passed - Patient does NOT have a diagnosis of CHF.        Passed - Medication is active on med list           glipiZIDE (GLUCOTROL XL) 10 MG 24 " "hr tablet [Pharmacy Med Name: glipiZIDE ER Oral Tablet Extended Release 24 Hour 10 MG] 180 tablet 2     Sig: TAKE 2 TABLETS BY MOUTH ONCE DAILY  Last Written Prescription Date:  3/13/20  Last Fill Quantity: 180,  # refills: 2   Last office visit: 9/3/2019 with prescribing provider:  DANIELA Romero   Future Office Visit:         Sulfonylurea Agents Failed - 3/13/2020  4:15 PM        Failed - Patient has documented A1c within the specified period of time.     If HgbA1C is 8 or greater, it needs to be on file within the past 3 months.  If less than 8, must be on file within the past 6 months.     Recent Labs   Lab Test 07/23/19  1730   A1C 7.5*             Failed - Recent (6 mo) or future (30 days) visit within the authorizing provider's specialty     Patient had office visit in the last 6 months or has a visit in the next 30 days with authorizing provider or within the authorizing provider's specialty.  See \"Patient Info\" tab in inbasket, or \"Choose Columns\" in Meds & Orders section of the refill encounter.            Passed - Blood pressure less than 140/90 in past 6 months     BP Readings from Last 3 Encounters:   12/31/19 138/82   09/03/19 122/77   07/23/19 102/68                 Passed - Patient has documented LDL within the past 12 mos.     Recent Labs   Lab Test 07/23/19  1730   LDL 52             Passed - Patient has had a Microalbumin in the past 15 mos.     Recent Labs   Lab Test 07/23/19  1735   MICROL 31   UMALCR 26.24*             Passed - Medication is active on med list        Passed - Patient is age 18 or older        Passed - Patient has a recent creatinine (normal) within the past 12 mos.     Recent Labs   Lab Test 09/03/19  1310   CR 0.77       Ok to refill medication if creatinine is low             "

## 2020-03-16 RX ORDER — GLIPIZIDE 10 MG/1
TABLET, FILM COATED, EXTENDED RELEASE ORAL
Qty: 180 TABLET | Refills: 0 | Status: SHIPPED | OUTPATIENT
Start: 2020-03-16 | End: 2020-07-23

## 2020-03-16 NOTE — TELEPHONE ENCOUNTER
Routing refill request to provider for review/approval because:  Labs not current:  a1c  Patient needs to be seen because:  Pt overdue for diabetic follow up- 1/23/20    Medication pended for approval, 90 day supply with reminder.

## 2020-04-20 DIAGNOSIS — E11.65 TYPE 2 DIABETES MELLITUS WITH HYPERGLYCEMIA, WITHOUT LONG-TERM CURRENT USE OF INSULIN (H): ICD-10-CM

## 2020-04-20 NOTE — TELEPHONE ENCOUNTER
"Requested Prescriptions   Pending Prescriptions Disp Refills     KLS ASPIRIN LOW DOSE 81 MG EC tablet [Pharmacy Med Name: KLS Aspirin Low Dose Oral Tablet Delayed Release 81 MG] 30 tablet 2     Sig: TAKE 1 TABLET BY MOUTH ONCE DAILY   Last Written Prescription Date:  03/28/19  Last Fill Quantity: 90,  # refills: 3   Last office visit: 9/3/2019 with prescribing provider:  TRINI Romero   Future Office Visit:        Analgesics (Non-Narcotic Tylenol and ASA Only) Passed - 4/20/2020  6:04 AM        Passed - Recent (12 mo) or future (30 days) visit within the authorizing provider's specialty     Patient has had an office visit with the authorizing provider or a provider within the authorizing providers department within the previous 12 mos or has a future within next 30 days. See \"Patient Info\" tab in inbasket, or \"Choose Columns\" in Meds & Orders section of the refill encounter.              Passed - Patient is age 20 years or older     If ASA is flagged for ages under 20 years old. Forward to provider for confirmation Ryes Syndrome is not a concern.              Passed - Medication is active on med list             "

## 2020-04-21 RX ORDER — ASPIRIN 81 MG/1
TABLET, COATED ORAL
Qty: 90 TABLET | Refills: 0 | Status: SHIPPED | OUTPATIENT
Start: 2020-04-21 | End: 2020-07-23

## 2020-04-21 NOTE — TELEPHONE ENCOUNTER
Medication is being filled for 1 time refill only due to:  due for appt. reminder sent.   Nicole Bolanos RN

## 2020-07-07 DIAGNOSIS — I10 HYPERTENSION GOAL BP (BLOOD PRESSURE) < 140/90: ICD-10-CM

## 2020-07-07 DIAGNOSIS — E11.65 TYPE 2 DIABETES MELLITUS WITH HYPERGLYCEMIA, WITHOUT LONG-TERM CURRENT USE OF INSULIN (H): ICD-10-CM

## 2020-07-07 DIAGNOSIS — E78.5 HYPERLIPIDEMIA LDL GOAL <100: ICD-10-CM

## 2020-07-08 NOTE — TELEPHONE ENCOUNTER
Pt overdue for DM recheck-please call to schedule, then send back to refill pool.  Nay George RN

## 2020-07-09 NOTE — TELEPHONE ENCOUNTER
Routing refill request to provider for review/approval because:  Stefanie given x1 and patient did not follow up, please advise.  Pended with reminder appt due.

## 2020-07-10 RX ORDER — LISINOPRIL 10 MG/1
TABLET ORAL
Qty: 30 TABLET | Refills: 0 | Status: SHIPPED | OUTPATIENT
Start: 2020-07-10 | End: 2020-07-23

## 2020-07-10 RX ORDER — ATORVASTATIN CALCIUM 80 MG/1
TABLET, FILM COATED ORAL
Qty: 30 TABLET | Refills: 0 | Status: SHIPPED | OUTPATIENT
Start: 2020-07-10 | End: 2020-07-23

## 2020-07-10 NOTE — TELEPHONE ENCOUNTER
Frank is due for his annual physical and recheck of his blood pressure.  Have him schedule an appt.

## 2020-07-23 ENCOUNTER — OFFICE VISIT (OUTPATIENT)
Dept: FAMILY MEDICINE | Facility: CLINIC | Age: 60
End: 2020-07-23
Payer: COMMERCIAL

## 2020-07-23 VITALS
SYSTOLIC BLOOD PRESSURE: 129 MMHG | WEIGHT: 204.4 LBS | DIASTOLIC BLOOD PRESSURE: 83 MMHG | TEMPERATURE: 97.2 F | RESPIRATION RATE: 20 BRPM | BODY MASS INDEX: 27.09 KG/M2 | HEART RATE: 68 BPM | HEIGHT: 73 IN | OXYGEN SATURATION: 100 %

## 2020-07-23 DIAGNOSIS — E78.5 HYPERLIPIDEMIA LDL GOAL <100: ICD-10-CM

## 2020-07-23 DIAGNOSIS — N42.81 PROSTATODYNIA: ICD-10-CM

## 2020-07-23 DIAGNOSIS — I10 HYPERTENSION GOAL BP (BLOOD PRESSURE) < 140/90: Primary | ICD-10-CM

## 2020-07-23 DIAGNOSIS — B35.1 ONYCHOMYCOSIS: ICD-10-CM

## 2020-07-23 DIAGNOSIS — Z12.5 SCREENING FOR PROSTATE CANCER: ICD-10-CM

## 2020-07-23 DIAGNOSIS — E11.65 TYPE 2 DIABETES MELLITUS WITH HYPERGLYCEMIA, WITHOUT LONG-TERM CURRENT USE OF INSULIN (H): ICD-10-CM

## 2020-07-23 DIAGNOSIS — N52.8 OTHER MALE ERECTILE DYSFUNCTION: ICD-10-CM

## 2020-07-23 LAB
ANION GAP SERPL CALCULATED.3IONS-SCNC: 7 MMOL/L (ref 3–14)
BUN SERPL-MCNC: 12 MG/DL (ref 7–30)
CALCIUM SERPL-MCNC: 9 MG/DL (ref 8.5–10.1)
CHLORIDE SERPL-SCNC: 109 MMOL/L (ref 94–109)
CHOLEST SERPL-MCNC: 94 MG/DL
CO2 SERPL-SCNC: 25 MMOL/L (ref 20–32)
CREAT SERPL-MCNC: 0.72 MG/DL (ref 0.66–1.25)
CREAT UR-MCNC: 41 MG/DL
GFR SERPL CREATININE-BSD FRML MDRD: >90 ML/MIN/{1.73_M2}
GLUCOSE SERPL-MCNC: 123 MG/DL (ref 70–99)
HBA1C MFR BLD: 7.4 % (ref 0–5.6)
HDLC SERPL-MCNC: 36 MG/DL
LDLC SERPL CALC-MCNC: 38 MG/DL
MICROALBUMIN UR-MCNC: 16 MG/L
MICROALBUMIN/CREAT UR: 39.02 MG/G CR (ref 0–17)
NONHDLC SERPL-MCNC: 58 MG/DL
POTASSIUM SERPL-SCNC: 4.5 MMOL/L (ref 3.4–5.3)
PSA SERPL-ACNC: 0.57 UG/L (ref 0–4)
SODIUM SERPL-SCNC: 141 MMOL/L (ref 133–144)
TRIGL SERPL-MCNC: 102 MG/DL

## 2020-07-23 PROCEDURE — 99207 C FOOT EXAM  NO CHARGE: CPT | Performed by: PHYSICIAN ASSISTANT

## 2020-07-23 PROCEDURE — 36415 COLL VENOUS BLD VENIPUNCTURE: CPT | Performed by: PHYSICIAN ASSISTANT

## 2020-07-23 PROCEDURE — 82043 UR ALBUMIN QUANTITATIVE: CPT | Performed by: PHYSICIAN ASSISTANT

## 2020-07-23 PROCEDURE — G0103 PSA SCREENING: HCPCS | Performed by: PHYSICIAN ASSISTANT

## 2020-07-23 PROCEDURE — 80061 LIPID PANEL: CPT | Performed by: PHYSICIAN ASSISTANT

## 2020-07-23 PROCEDURE — 83036 HEMOGLOBIN GLYCOSYLATED A1C: CPT | Performed by: PHYSICIAN ASSISTANT

## 2020-07-23 PROCEDURE — 80048 BASIC METABOLIC PNL TOTAL CA: CPT | Performed by: PHYSICIAN ASSISTANT

## 2020-07-23 PROCEDURE — 99214 OFFICE O/P EST MOD 30 MIN: CPT | Performed by: PHYSICIAN ASSISTANT

## 2020-07-23 RX ORDER — ATORVASTATIN CALCIUM 80 MG/1
80 TABLET, FILM COATED ORAL DAILY
Qty: 90 TABLET | Refills: 3 | Status: SHIPPED | OUTPATIENT
Start: 2020-07-23 | End: 2020-12-29

## 2020-07-23 RX ORDER — SILDENAFIL CITRATE 20 MG/1
TABLET ORAL
Qty: 30 TABLET | Refills: 11 | Status: SHIPPED | OUTPATIENT
Start: 2020-07-23 | End: 2021-02-05

## 2020-07-23 RX ORDER — GLIPIZIDE 10 MG/1
TABLET, FILM COATED, EXTENDED RELEASE ORAL
Qty: 60 TABLET | Refills: 0 | Status: SHIPPED | OUTPATIENT
Start: 2020-07-23 | End: 2020-07-23

## 2020-07-23 RX ORDER — LISINOPRIL 10 MG/1
10 TABLET ORAL DAILY
Qty: 90 TABLET | Refills: 1 | Status: SHIPPED | OUTPATIENT
Start: 2020-07-23 | End: 2020-12-29

## 2020-07-23 RX ORDER — TERBINAFINE HYDROCHLORIDE 250 MG/1
250 TABLET ORAL DAILY
Qty: 30 TABLET | Refills: 2 | Status: CANCELLED | OUTPATIENT
Start: 2020-07-23

## 2020-07-23 RX ORDER — GLIPIZIDE 10 MG/1
TABLET, FILM COATED, EXTENDED RELEASE ORAL
Qty: 180 TABLET | Refills: 0 | Status: SHIPPED | OUTPATIENT
Start: 2020-07-23 | End: 2020-11-26

## 2020-07-23 ASSESSMENT — MIFFLIN-ST. JEOR: SCORE: 1796.03

## 2020-07-23 NOTE — TELEPHONE ENCOUNTER
dao is due for a recheck. Have him make an appt to see me.for a face to face visit to recheck his diabetes

## 2020-07-23 NOTE — PROGRESS NOTES
Subjective     Waqar Troncoso is a 60 year old male who presents to clinic today for the following health issues:    HPI     Some perineal pain. Some urinary hesitancy.   ED issues.   Diabetes Follow-up      How often are you checking your blood sugar? Not at all    What concerns do you have today about your diabetes? None     Do you have any of these symptoms? (Select all that apply)  No numbness or tingling in feet.  No redness, sores or blisters on feet.  No complaints of excessive thirst.  No reports of blurry vision.  No significant changes to weight.    Will start watching diet more closely.  Walking a lot more at work.   No chest pain/sob/palps/dizziness. No neuropathy symptoms . Vision stable.   Hyperlipidemia Follow-Up      Are you regularly taking any medication or supplement to lower your cholesterol?   Yes- Atorvastatin    Are you having muscle aches or other side effects that you think could be caused by your cholesterol lowering medication?  No    Hypertension Follow-up      Do you check your blood pressure regularly outside of the clinic? No     Are you following a low salt diet? Yes    Are your blood pressures ever more than 140 on the top number (systolic) OR more   than 90 on the bottom number (diastolic), for example 140/90? No    BP Readings from Last 2 Encounters:   07/23/20 129/83   12/31/19 138/82     Hemoglobin A1C (%)   Date Value   07/23/2020 7.4 (H)   07/23/2019 7.5 (H)     LDL Cholesterol Calculated (mg/dL)   Date Value   07/23/2019 52   08/02/2018 41         How many servings of fruits and vegetables do you eat daily?  2-3    On average, how many sweetened beverages do you drink each day (Examples: soda, juice, sweet tea, etc.  Do NOT count diet or artificially sweetened beverages)?   0-1    How many days per week do you exercise enough to make your heart beat faster? 7    How many minutes a day do you exercise enough to make your heart beat faster? 30 - 60    How many days per week do  you miss taking your medication? 0-1        Patient Active Problem List   Diagnosis     Hyperlipidemia LDL goal <100     Type 2 diabetes mellitus with hyperglycemia, without long-term current use of insulin (H)     Tobacco use disorder     Advanced directives, counseling/discussion     Benign essential hypertension     Urinary hesitancy     Hypertension goal BP (blood pressure) < 140/90     Peripheral neuritis     Overweight     Impotence     Arthritis     Past Surgical History:   Procedure Laterality Date     COLONOSCOPY       FRACTURE TX, ANKLE RT/LT  1999    Fracture TX Ankle left, 13 surgeries per pt     HERNIA REPAIR, UMBILICAL  1995     ROTATOR CUFF REPAIR RT/LT  ~1990    right and left     VASCULAR SURGERY  >1999    reconstruction post left ankle injury       Social History     Tobacco Use     Smoking status: Current Some Day Smoker     Packs/day: 1.00     Years: 10.00     Pack years: 10.00     Types: Cigarettes     Smokeless tobacco: Former User     Types: Chew   Substance Use Topics     Alcohol use: Yes     Alcohol/week: 2.5 standard drinks     Types: 3 Standard drinks or equivalent per week     Comment: 3 drinks per month     Family History   Problem Relation Age of Onset     Diabetes Mother      Diabetes Father      Diabetes Maternal Grandmother      Diabetes Maternal Grandfather      Diabetes Paternal Grandmother      Diabetes Paternal Grandfather      Cancer - colorectal No family hx of      Prostate Cancer No family hx of          Current Outpatient Medications   Medication Sig Dispense Refill     atorvastatin (LIPITOR) 80 MG tablet TAKE 1 TABLET BY MOUTH ONCE DAILY 30 tablet 0     glipiZIDE (GLUCOTROL XL) 10 MG 24 hr tablet TAKE 2 TABLETS BY MOUTH ONCE DAILY 60 tablet 0     KLS ASPIRIN LOW DOSE 81 MG EC tablet TAKE 1 TABLET BY MOUTH ONCE DAILY 90 tablet 0     lisinopril (ZESTRIL) 10 MG tablet TAKE 1 TABLET BY MOUTH ONCE DAILY 30 tablet 0     metFORMIN (GLUCOPHAGE) 1000 MG tablet TAKE ONE TABLET BY  "MOUTH TWICE A DAY WITH MEALS  60 tablet 0     terbinafine (LAMISIL) 250 MG tablet Take 1 tablet (250 mg) by mouth daily 30 tablet 2     Allergies   Allergen Reactions     Oxycontin [Kdc:Ci Pigment Blue 63+Oxycodone]      Sweating, hyperventilating per patient      Recent Labs   Lab Test 07/23/20  0720 12/31/19  1624 09/03/19  1310 07/23/19  1730 03/28/19  0900 08/02/18  1515  08/07/12  0837   A1C 7.4*  --   --  7.5* 7.8* 8.9*  --  6.4*   LDL  --   --   --  52  --  41  --  77   HDL  --   --   --  38*  --  35*  --  34*   TRIG  --   --   --  128  --  126  --  170*   ALT  --  41  --   --   --  30  --   --    CR  --   --  0.77 0.77  --  0.78  --  0.78   GFRESTIMATED  --   --  >90 >90  --  >90  --  >90   GFRESTBLACK  --   --  >90 >90  --  >90  --  >90   POTASSIUM  --   --  4.2 4.0  --  4.3   < >  --    TSH  --   --   --  2.79  --  4.26*  --  4.11    < > = values in this interval not displayed.      BP Readings from Last 3 Encounters:   07/23/20 129/83   12/31/19 138/82   09/03/19 122/77    Wt Readings from Last 3 Encounters:   07/23/20 92.7 kg (204 lb 6.4 oz)   12/31/19 97.5 kg (215 lb)   09/03/19 95.3 kg (210 lb)                    Reviewed and updated as needed this visit by Provider         Review of Systems   Constitutional, HEENT, cardiovascular, pulmonary, GI, , musculoskeletal, neuro, skin, endocrine and psych systems are negative, except as otherwise noted.      Objective    /83   Pulse 68   Temp 97.2  F (36.2  C) (Tympanic)   Resp 20   Ht 1.854 m (6' 1\")   Wt 92.7 kg (204 lb 6.4 oz)   SpO2 100%   BMI 26.97 kg/m    Body mass index is 26.97 kg/m .  Physical Exam   GENERAL: healthy, alert and no distress  EYES: Eyes grossly normal to inspection, PERRL and conjunctivae and sclerae normal  HENT: ear canals and TM's normal, nose and mouth without ulcers or lesions  NECK: no adenopathy, no asymmetry, masses, or scars and thyroid normal to palpation  RESP: lungs clear to auscultation - no rales, rhonchi or " wheezes  CV: regular rate and rhythm, normal S1 S2, no S3 or S4, no murmur, click or rub, no peripheral edema and peripheral pulses strong  ABDOMEN: soft, nontender, no hepatosplenomegaly, no masses and bowel sounds normal  MS: no gross musculoskeletal defects noted, no edema  SKIN: no suspicious lesions or rashes  NEURO: Normal strength and tone, mentation intact and speech normal  PSYCH: mentation appears normal, affect normal/bright  Foot exam - both sides normal; no swelling, tenderness or skin or vascular lesions. Color and temperature is normal. Sensation is intact. Peripheral pulses are palpable. Toenails are normal.    Diagnostic Test Results:  Labs reviewed in Epic        Waqar was seen today for diabetes, hypertension and hyperlipidemia.    Diagnoses and all orders for this visit:    Hypertension goal BP (blood pressure) < 140/90  -     JUST IN CASE  -     Basic metabolic panel  (Ca, Cl, CO2, Creat, Gluc, K, Na, BUN)  -     lisinopril (ZESTRIL) 10 MG tablet; Take 1 tablet (10 mg) by mouth daily    Type 2 diabetes mellitus with hyperglycemia, without long-term current use of insulin (H)  -     HEMOGLOBIN A1C  -     Albumin Random Urine Quantitative with Creat Ratio  -     JUST IN CASE  -     glipiZIDE (GLUCOTROL XL) 10 MG 24 hr tablet; TAKE 2 TABLETS BY MOUTH ONCE DAILY  -     aspirin (KLS ASPIRIN LOW DOSE) 81 MG EC tablet; Take 1 tablet (81 mg) by mouth daily  -     metFORMIN (GLUCOPHAGE) 1000 MG tablet; Take 1 tablet (1,000 mg) by mouth 2 times daily (with meals)  -     FOOT EXAM    Hyperlipidemia LDL goal <100  -     Lipid panel reflex to direct LDL Fasting  -     JUST IN CASE  -     atorvastatin (LIPITOR) 80 MG tablet; Take 1 tablet (80 mg) by mouth daily    Onychomycosis    Other male erectile dysfunction  -     sildenafil (REVATIO) 20 MG tablet; 2-5 taks 1/2-4 hours prior to relations    Prostatodynia  -     UROLOGY ADULT REFERRAL      work on lifestyle modification  Recheck in 3-4 mos.

## 2020-11-25 DIAGNOSIS — E11.65 TYPE 2 DIABETES MELLITUS WITH HYPERGLYCEMIA, WITHOUT LONG-TERM CURRENT USE OF INSULIN (H): ICD-10-CM

## 2020-11-25 NOTE — TELEPHONE ENCOUNTER
Patient overdue for diabetic OV with PCP.   No appointment pending at this time.  Routing to provider to advise.    Malissa BLANCON, RN

## 2020-11-26 RX ORDER — GLIPIZIDE 10 MG/1
TABLET, FILM COATED, EXTENDED RELEASE ORAL
Qty: 60 TABLET | Refills: 0 | Status: SHIPPED | OUTPATIENT
Start: 2020-11-26 | End: 2020-12-29

## 2020-12-15 ENCOUNTER — DOCUMENTATION ONLY (OUTPATIENT)
Dept: FAMILY MEDICINE | Facility: CLINIC | Age: 60
End: 2020-12-15

## 2020-12-15 NOTE — TELEPHONE ENCOUNTER
Please advise, and order the labs you would like for the patient. If there is not anything needed please let us know and we can call and then cancel the appointment. Thank you.

## 2020-12-15 NOTE — PROGRESS NOTES
Patient has up coming lab only appointment on 12/21/20, please review and place future order. If the lab is not needed please follow up with patient .  Thank You  ROSAMARIA Gordon

## 2020-12-16 NOTE — TELEPHONE ENCOUNTER
He doesn't need a lab appt. He needs to see me for a face to face recheck of his diabetes. We can draw a stat a1c when he see's me for his appt. Have him scheduled to see me in the next several weeks.

## 2020-12-17 NOTE — TELEPHONE ENCOUNTER
Have patient schedule a diabetes recheck with me . Will take care of any necessary blood work at his visit with me.  See my  previous message

## 2020-12-29 ENCOUNTER — OFFICE VISIT (OUTPATIENT)
Dept: FAMILY MEDICINE | Facility: CLINIC | Age: 60
End: 2020-12-29
Payer: COMMERCIAL

## 2020-12-29 VITALS
WEIGHT: 196.6 LBS | HEART RATE: 64 BPM | TEMPERATURE: 96.6 F | DIASTOLIC BLOOD PRESSURE: 63 MMHG | BODY MASS INDEX: 25.94 KG/M2 | OXYGEN SATURATION: 98 % | RESPIRATION RATE: 20 BRPM | SYSTOLIC BLOOD PRESSURE: 115 MMHG

## 2020-12-29 DIAGNOSIS — E11.65 TYPE 2 DIABETES MELLITUS WITH HYPERGLYCEMIA, WITHOUT LONG-TERM CURRENT USE OF INSULIN (H): Primary | ICD-10-CM

## 2020-12-29 DIAGNOSIS — I10 HYPERTENSION GOAL BP (BLOOD PRESSURE) < 140/90: ICD-10-CM

## 2020-12-29 DIAGNOSIS — N52.8 OTHER MALE ERECTILE DYSFUNCTION: ICD-10-CM

## 2020-12-29 DIAGNOSIS — E78.5 HYPERLIPIDEMIA LDL GOAL <100: ICD-10-CM

## 2020-12-29 LAB — HBA1C MFR BLD: 7.4 % (ref 0–5.6)

## 2020-12-29 PROCEDURE — 99214 OFFICE O/P EST MOD 30 MIN: CPT | Performed by: PHYSICIAN ASSISTANT

## 2020-12-29 PROCEDURE — 83036 HEMOGLOBIN GLYCOSYLATED A1C: CPT | Performed by: PHYSICIAN ASSISTANT

## 2020-12-29 PROCEDURE — 36415 COLL VENOUS BLD VENIPUNCTURE: CPT | Performed by: PHYSICIAN ASSISTANT

## 2020-12-29 RX ORDER — ATORVASTATIN CALCIUM 80 MG/1
80 TABLET, FILM COATED ORAL DAILY
Qty: 90 TABLET | Refills: 3 | Status: SHIPPED | OUTPATIENT
Start: 2020-12-29

## 2020-12-29 RX ORDER — GLIPIZIDE 10 MG/1
TABLET, FILM COATED, EXTENDED RELEASE ORAL
Qty: 180 TABLET | Refills: 1 | Status: SHIPPED | OUTPATIENT
Start: 2020-12-29 | End: 2021-08-02

## 2020-12-29 RX ORDER — LISINOPRIL 10 MG/1
10 TABLET ORAL DAILY
Qty: 90 TABLET | Refills: 1 | Status: SHIPPED | OUTPATIENT
Start: 2020-12-29

## 2021-01-12 ENCOUNTER — TRANSFERRED RECORDS (OUTPATIENT)
Dept: HEALTH INFORMATION MANAGEMENT | Facility: CLINIC | Age: 61
End: 2021-01-12

## 2021-01-12 LAB — RETINOPATHY: NEGATIVE

## 2021-02-05 ENCOUNTER — OFFICE VISIT (OUTPATIENT)
Dept: UROLOGY | Facility: CLINIC | Age: 61
End: 2021-02-05
Attending: PHYSICIAN ASSISTANT
Payer: COMMERCIAL

## 2021-02-05 VITALS
TEMPERATURE: 96.9 F | HEART RATE: 77 BPM | OXYGEN SATURATION: 98 % | DIASTOLIC BLOOD PRESSURE: 81 MMHG | SYSTOLIC BLOOD PRESSURE: 139 MMHG

## 2021-02-05 DIAGNOSIS — N40.1 BENIGN PROSTATIC HYPERPLASIA WITH LOWER URINARY TRACT SYMPTOMS, SYMPTOM DETAILS UNSPECIFIED: Primary | ICD-10-CM

## 2021-02-05 DIAGNOSIS — N52.8 OTHER MALE ERECTILE DYSFUNCTION: ICD-10-CM

## 2021-02-05 PROCEDURE — 99244 OFF/OP CNSLTJ NEW/EST MOD 40: CPT | Performed by: UROLOGY

## 2021-02-05 RX ORDER — TAMSULOSIN HYDROCHLORIDE 0.4 MG/1
0.4 CAPSULE ORAL AT BEDTIME
Qty: 90 CAPSULE | Refills: 3 | Status: SHIPPED | OUTPATIENT
Start: 2021-02-05

## 2021-02-05 NOTE — PROGRESS NOTES
S: Waqar Troncoso is a pleasant  60 year old male who was requested to be seen by  Tip Romero for a consult with regard to patient's urinary complaints and ED.  Patient complains of Hesitancy in starting urinary stream and Strain to Urinate.  He has no history of elevated PSA.  Symptoms have been on going for   several years(s).  Seems to be worsened over time.  His recent PSA was found to be   PSA   Date Value Ref Range Status   07/23/2020 0.57 0 - 4 ug/L Final     Comment:     Assay Method:  Chemiluminescence using Siemens Vista analyzer   .  His AUA Symptom Score:  15.  His QOL score:  3.  He also has erectile dysfunction.  This has been going on for a number of years.  He has tried oral medications, penile injections, vacuum pump without any success.  His risk factors are high blood pressure, diabetes and smoking.  Patient is currently trying to stop smoking.    Current Outpatient Medications   Medication Sig Dispense Refill     aspirin (KLS ASPIRIN LOW DOSE) 81 MG EC tablet Take 1 tablet (81 mg) by mouth daily 90 tablet 1     atorvastatin (LIPITOR) 80 MG tablet Take 1 tablet (80 mg) by mouth daily 90 tablet 3     glipiZIDE (GLUCOTROL XL) 10 MG 24 hr tablet TAKE 2 TABLETS BY MOUTH ONCE DAILY 180 tablet 1     lisinopril (ZESTRIL) 10 MG tablet Take 1 tablet (10 mg) by mouth daily 90 tablet 1     metFORMIN (GLUCOPHAGE) 1000 MG tablet TAKE 1 TABLET BY MOUTH TWICE DAILY WITH MEALS 180 tablet 0     tamsulosin (FLOMAX) 0.4 MG capsule Take 1 capsule (0.4 mg) by mouth At Bedtime 90 capsule 3     terbinafine (LAMISIL) 250 MG tablet Take 1 tablet (250 mg) by mouth daily 30 tablet 2     Allergies   Allergen Reactions     Oxycontin [Kdc:Ci Pigment Blue 63+Oxycodone]      Sweating, hyperventilating per patient      Past Medical History:   Diagnosis Date     Arthritis 2/19/2007     Benign essential hypertension 8/2/2018     Past Surgical History:   Procedure Laterality Date     COLONOSCOPY       FRACTURE TX, ANKLE RT/LT   1999    Fracture TX Ankle left, 13 surgeries per pt     HERNIA REPAIR, UMBILICAL  1995     ROTATOR CUFF REPAIR RT/LT  ~1990    right and left     VASCULAR SURGERY  >1999    reconstruction post left ankle injury      Family History   Problem Relation Age of Onset     Diabetes Mother      Diabetes Father      Diabetes Maternal Grandmother      Diabetes Maternal Grandfather      Diabetes Paternal Grandmother      Diabetes Paternal Grandfather      Cancer - colorectal No family hx of      Prostate Cancer No family hx of      He does not have a family history of prostate cancer.  Social History     Socioeconomic History     Marital status:      Spouse name: Lucero     Number of children: 2     Years of education: None     Highest education level: None   Occupational History     Occupation: [email or web address]      Employer: [employer]     Financial resource strain: None     Food insecurity     Worry: None     Inability: None     Transportation needs     Medical: None     Non-medical: None   Tobacco Use     Smoking status: Current Some Day Smoker     Packs/day: 1.00     Years: 10.00     Pack years: 10.00     Types: Cigarettes     Smokeless tobacco: Former User     Types: Chew   Substance and Sexual Activity     Alcohol use: Yes     Alcohol/week: 2.5 standard drinks     Types: 3 Standard drinks or equivalent per week     Comment: 3 drinks per month     Drug use: No     Sexual activity: Yes     Partners: Female   Lifestyle     Physical activity     Days per week: None     Minutes per session: None     Stress: None   Relationships     Social connections     Talks on phone: None     Gets together: None     Attends [employer] service: None     Active member of club or organization: None     Attends meetings of clubs or organizations: None     Relationship status: None     Intimate partner violence     Fear of current or ex partner: None     Emotionally abused: None     Physically abused: None      Forced sexual activity: None   Other Topics Concern     Parent/sibling w/ CABG, MI or angioplasty before 65F 55M? No   Social History Narrative     None        REVIEW OF SYSTEMS  =================  C: NEGATIVE for fever, chills, change in weight  I: NEGATIVE for worrisome rashes, moles or lesions  E/M: NEGATIVE for ear, mouth and throat problems  R: NEGATIVE for significant cough or SHORTNESS OF BREATH  CV:  NEGATIVE for chest pain, palpitations or peripheral edema  GI: NEGATIVE for nausea, abdominal pain, heartburn, or change in bowel habits  NEURO: NEGATIVE numbness/weakness  : see HPI  PSYCH: NEGATIVE depression/anxiety  LYmph: no new enlarged lymph nodes  Ortho: no new trauma/movements           O: Exam:/81   Pulse 77   Temp 96.9  F (36.1  C) (Tympanic)   SpO2 98%    Constitutional: healthy, alert and no distress  Cardiovascular: negative, PMI normal.   Respiratory: negative, no evidence of respiratory distress  Gastrointestinal: Abdomen soft, non-tender. BS normal. No masses, organomegaly  : Penis no discharge.  Testes without any masses.  No scrotal skin lesion.  Prostate about 30 g in size smooth.  Bladder scan less than 100 mL.  Musculoskeletal: extremities normal- no gross deformities noted, gait normal and normal muscle tone  Skin: no suspicious lesions or rashes  Neurologic: Alert and oriented  Psychiatric: mentation appears normal. and affect normal/bright  Hematologic/Lymphatic/Immunologic: normal ant/post cervical, axillary, supraclavicular and inguinal nodes    Assessment/Plan:   (N40.1) Benign prostatic hyperplasia with lower urinary tract symptoms, symptom details unspecified  (primary encounter diagnosis)  Comment:    Plan: tamsulosin (FLOMAX) 0.4 MG capsule         Side effects discussed.    (N52.8) Other male erectile dysfunction  Comment: Failed conservative therapy.  Plan: See Dr. Lupillo Velazquez for penile prosthesis.

## 2021-02-05 NOTE — PATIENT INSTRUCTIONS
Patient Education   Resources to Help You Quit Smoking  If you have quit smoking or are thinking about quitting, congratulations! It can be hard to quit smoking, but the benefits are well worth it. To help you quit and stay smoke-free, there are many resources that can help.  Your health plan  If you have health insurance, call them for more details about their phone coaching programs.    [facility]:  6-711-155-BLUE ([phone])    [facility]:  8-967-682-QUIT ([phone])    [facility]:  5-679-926-BLUE ([phone])    [facility]:  [phone]    [employer]:  [phone]    [employer] members:  [phone]    [facility]:   [phone]    [facility]:  [phone]    [facility]:  [phone]  Other resources  American Cancer Society: 1-968.779.9607  The American Cancer Society can help you find local resources to quit smoking.  [employer]: 7-948-610-PLAN (1-843.405.9741)  Offers a telephone helpline, gum, patches and lozenges. These services are free for the uninsured and those without coverage. The online program is free to everyone at www.quitplan.com.  American Lung Association: 3-313-LUNG-USA (1-930.214.1299)  Provides a lung helpline as well as an online program, self-help book and group clinic support for quitting smoking. www.lung.org/stop-smoking  National Cancer [place]:  1-600-633-QUIT (1-436.362.2924)  Offers a telephone hotline, online text chat and a website with tools, information and support for smokers who want to quit. www.smokefree.gov  Medication Therapy Management (MTM):  781-321-1805-672-7005 [phone] (toll free)  mtm@[facility].org  This is a clinic program to help you quit smoking. It offers one-on-one sessions with a pharmacist. Call or email to find out if your insurance covers MTM and to schedule an appointment at all locations.  For informational purposes only.  Not to replace the advice of your health care provider. Copyright   2013 [employer]. All rights reserved. Clinically reviewed by Sravanthi Cronin MD, [facility] Lung Cancer Screening Program. [employer] 206435 - Rev 06/19.

## 2021-02-11 NOTE — TELEPHONE ENCOUNTER
MEDICAL RECORDS REQUEST   [facility]  Urology Clinic  [address] [place] [address]  PHONE: [phone]  Fax: [phone]        FUTURE VISIT INFORMATION                                                   Waqar Troncoso, : 1960 scheduled for future visit at [facility]    APPOINTMENT INFORMATION:    Date: 6/3/2021 10AM    Provider:  Marcus BENITEZ    Reason for Visit/Diagnosis: ED Issues     REFERRAL INFORMATION:    Referring provider: N/A     Specialty: N/A    Referring providers clinic:      Clinic contact number:  N/A    RECORDS REQUESTED FOR VISIT                                                     NOTES  STATUS/DETAILS   OFFICE NOTE from referring provider  yes   OFFICE NOTE from other specialist  yes   DISCHARGE SUMMARY from hospital  no   DISCHARGE REPORT from the ER  no   OPERATIVE REPORT  no   MEDICATION LIST  yes     PRE-VISIT CHECKLIST      Record collection complete Yes   Appointment appropriately scheduled           (right time/right provider) Yes   MyChart activation No- Declined   Questionnaire complete If no, please explain: in process      Completed by: Ade Marcial

## 2021-03-22 ENCOUNTER — TELEPHONE (OUTPATIENT)
Dept: FAMILY MEDICINE | Facility: CLINIC | Age: 61
End: 2021-03-22

## 2021-03-22 NOTE — TELEPHONE ENCOUNTER
Diabetes Education Scheduling Outreach #1:    Call to patient to schedule. Left message with phone number to call to schedule.    Plan for 2nd outreach attempt within 1 week.    Selene Moyer  [facility]  Diabetes and Nutrition Scheduling

## 2021-03-22 NOTE — LETTER
April 7, 2021      Waqar Troncoso  [address] [facility] [address]        Dear Waqar,     The diabetic educator has tried to reach you several times to schedule an appointment.     You are due for a follow up diabetic visit as well. This visit can be done in person, video or phone. You can schedule appointment via your [employer] account or call [phone] to schedule.       Sincerely,        Tip Romero PA-C/mp

## 2021-04-06 NOTE — TELEPHONE ENCOUNTER
Diabetes Education Scheduling Outreach #2:    Call to patient to schedule. Left message with phone number to call to schedule.    Selene Moyer  [facility]  Diabetes and Nutrition Scheduling

## 2021-04-21 ENCOUNTER — TELEPHONE (OUTPATIENT)
Dept: FAMILY MEDICINE | Facility: CLINIC | Age: 61
End: 2021-04-21

## 2021-04-21 DIAGNOSIS — E11.65 TYPE 2 DIABETES MELLITUS WITH HYPERGLYCEMIA, WITHOUT LONG-TERM CURRENT USE OF INSULIN (H): Primary | ICD-10-CM

## 2021-04-21 DIAGNOSIS — E78.5 HYPERLIPIDEMIA LDL GOAL <100: ICD-10-CM

## 2021-04-21 NOTE — TELEPHONE ENCOUNTER
Yvrose with health [employer] calling for lab orders. She said the patient signed up for their wellness day and his labs will be free of charge if the provider can place orders to be faxed over to them. They will fax pcp the results as well.     She said in care everywhere it looks like he would be due for an A1C, but the provider can order what he thinks is appropriate for the patient at this time.     Fax number [phone]     Routed to provider to review and place appropriate orders to be faxed.     Thank you,   Graciela Burroughs RN

## 2021-05-06 ENCOUNTER — TRANSFERRED RECORDS (OUTPATIENT)
Dept: HEALTH INFORMATION MANAGEMENT | Facility: CLINIC | Age: 61
End: 2021-05-06

## 2021-05-06 LAB
CHOLESTEROL (EXTERNAL): 89 MG/DL (ref 0–199)
HDLC SERPL-MCNC: 34 MG/DL
LDL CHOLESTEROL CALCULATED (EXTERNAL): 36 MG/DL
TRIGLYCERIDES (EXTERNAL): 93 MG/DL

## 2021-05-06 NOTE — PROGRESS NOTES
Frank is a 61 year old who is being evaluated via a billable telephone visit.      What phone number would you like to be contacted at? [phone]  How would you like to obtain your AVS? Mail a copy        Subjective   Frank is a 61 year old who presents for the following health issues     HPI     Diabetes Follow-up      How often are you checking your blood sugar? Not at all    What concerns do you have today about your diabetes? None     Do you have any of these symptoms? (Select all that apply)  No numbness or tingling in feet.  No redness, sores or blisters on feet.  No complaints of excessive thirst.  No reports of blurry vision.  No significant changes to weight.  a1c up to 9.  Active on his job.  Will start watching his diet.  Stopped pm metformin dose due to gi side effects will try him on the ER version of metformin and see if he tolerates it better.  Rest of his labs look good (lipids/bmp)        BP Readings from Last 2 Encounters:   02/05/21 139/81   12/29/20 115/63     Hemoglobin A1C (%)   Date Value   12/29/2020 7.4 (H)   07/23/2020 7.4 (H)     LDL Cholesterol Calculated (mg/dL)   Date Value   07/23/2020 38   07/23/2019 52           How many servings of fruits and vegetables do you eat daily?  2-3    On average, how many sweetened beverages do you drink each day (Examples: soda, juice, sweet tea, etc.  Do NOT count diet or artificially sweetened beverages)?   0    How many days per week do you exercise enough to make your heart beat faster? 3 or less    How many minutes a day do you exercise enough to make your heart beat faster? 9 or less    How many days per week do you miss taking your medication? 0        Review of Systems   Constitutional, HEENT, cardiovascular, pulmonary, GI, , musculoskeletal, neuro, skin, endocrine and psych systems are negative, except as otherwise noted.      Objective           Vitals:  No vitals were obtained today due to virtual visit.    Physical Exam   healthy, alert  and no distress  PSYCH: Alert and oriented times 3; coherent speech, normal   rate and volume, able to articulate logical thoughts, able   to abstract reason, no tangential thoughts, no hallucinations   or delusions  His affect is normal  RESP: No cough, no audible wheezing, able to talk in full sentences  Remainder of exam unable to be completed due to telephone visits        Waqar was seen today for diabetes.    Diagnoses and all orders for this visit:    Type 2 diabetes mellitus with hyperglycemia, without long-term current use of insulin (H)  -     metFORMIN (GLUCOPHAGE-XR) 500 MG 24 hr tablet; Take 4 tablets (2,000 mg) by mouth daily (with dinner)    Hyperlipidemia LDL goal <100    Hypertension goal BP (blood pressure) < 140/90      work on lifestyle modification  Recheck in 3 mos     Phone call duration: 13 minutes

## 2021-05-07 ENCOUNTER — VIRTUAL VISIT (OUTPATIENT)
Dept: FAMILY MEDICINE | Facility: CLINIC | Age: 61
End: 2021-05-07
Payer: COMMERCIAL

## 2021-05-07 DIAGNOSIS — E78.5 HYPERLIPIDEMIA LDL GOAL <100: ICD-10-CM

## 2021-05-07 DIAGNOSIS — I10 HYPERTENSION GOAL BP (BLOOD PRESSURE) < 140/90: ICD-10-CM

## 2021-05-07 DIAGNOSIS — E11.65 TYPE 2 DIABETES MELLITUS WITH HYPERGLYCEMIA, WITHOUT LONG-TERM CURRENT USE OF INSULIN (H): Primary | ICD-10-CM

## 2021-05-07 PROCEDURE — 99213 OFFICE O/P EST LOW 20 MIN: CPT | Mod: 95 | Performed by: PHYSICIAN ASSISTANT

## 2021-05-07 RX ORDER — METFORMIN HCL 500 MG
2000 TABLET, EXTENDED RELEASE 24 HR ORAL
Qty: 360 TABLET | Refills: 0 | Status: SHIPPED | OUTPATIENT
Start: 2021-05-07 | End: 2021-09-01

## 2021-06-03 ENCOUNTER — PRE VISIT (OUTPATIENT)
Dept: UROLOGY | Facility: CLINIC | Age: 61
End: 2021-06-03

## 2021-07-20 PROBLEM — I10 BENIGN ESSENTIAL HYPERTENSION: Chronic | Status: ACTIVE | Noted: 2018-08-02

## 2021-07-20 ASSESSMENT — MIFFLIN-ST. JEOR: SCORE: 1752.93

## 2021-07-20 NOTE — PROGRESS NOTES
"    Does patient have any form of state insurance? Yes    Do you have wifi? Yes  Do you have a smart phone? Yes  Can you download an jacek on your phone comfortably with out assistance? Yes  Can you watch a Youtube video? Yes        Christine Marcial MA    Waqar Troncoso is a 61 year old male being evaluated via a billable telephone visit.     \"This telephone visit will be conducted via a call between you and your physician/provider. We have found that certain health care needs can be provided without the need for an in-person visit or physical exam.  This service lets us provide the care you need with a telephone conversation.  If a prescription is necessary we can send it directly to your pharmacy.  If lab work is needed we can place an order for that and you can then stop by our lab to have the test done at a later time.\"    Telephone visits are billed at different rates depending on your insurance coverage.  Please reach out to your insurance provider with any questions.    Patient has given verbal consent for  a Telephone visit? Yes    What telephone number would you like your provider to contact at at:  [phone]    How would you like to obtain your AVS? Mail a copy    SHAY Perez    Telephone Visit Details:     Telephone Visit Start Time: 11:05 AM    Telephone Visit End Time:11:34 AM    Sleep Consultation:    Date on this visit: 7/21/2021    Waqar Troncoso is sent by No ref. provider found for a sleep consultation.    Primary Physician: Tip Romero     Chief Complaint   Patient presents with     Snoring       Waqar Troncoso is a 61 year old male with medical history remarkable for hypertension, diabetes type 2, tobacco use and hyperlipidemia. He presents with snoring and concerns for sleep apnea.     Waqar goes to sleep at 10:30 PM during the week. He wakes up at 4:00 AM with an alarm. He falls asleep in 10-30 minutes.  Waqar denies difficulty falling asleep.  He wakes up 2 times a night for 5 " minutes before falling back to sleep.  Waqar wakes up to uncertain reasons.  On weekends, Waqar goes to sleep at 11:00 PM.  He wakes up at 7:00 AM without an alarm. He falls asleep in 10-30 minutes.  Patient gets an average of 6 hours of sleep per night.     Patient does not use electronics in bed and watch TV in bed.     Waqar does not do shift work.      Waqar does snore every night and snoring is loud. Patient does have a regular bed partner. There is not report of gasping.  He does not have witnessed apneas. They frequently sleep separately.  Patient sleeps on his side. He has occasional morning headaches (1/week) and no morning confusion and restless legs.  Waqar denies any bruxism, sleep walking, sleep talking, dream enactment, sleep paralysis, cataplexy and hypnogogic/hypnopompic hallucinations.    Waqar denies difficulty breathing through his nose.      Waqar has lost >50 lbs over the last years.  Patient describes themself as a morning person.  He would prefer to go to sleep at 9:30 PM and wake up at 5:00 AM.  Patient's [place] Sleepiness score 6/24 inconsistent with excessive  daytime sleepiness.  He reports tiredness and sleepiness around 1 PM    Waqar does not usually nap.  He takes no inadvertant naps.  He denies falling asleep while driving.   Patient was counseled on the importance of driving while alert, to pull over if drowsy, or nap before getting into the vehicle if sleepy.  He uses 5 cups/day of coffee. Last caffeine intake is usually before 6 PM.    Allergies:    Allergies   Allergen Reactions     Oxycontin [Kdc:Ci Pigment Blue 63+Oxycodone]      Sweating, hyperventilating per patient        Medications:    Current Outpatient Medications   Medication Sig Dispense Refill     aspirin (KLS ASPIRIN LOW DOSE) 81 MG EC tablet Take 1 tablet (81 mg) by mouth daily 90 tablet 1     atorvastatin (LIPITOR) 80 MG tablet Take 1 tablet (80 mg) by mouth daily 90 tablet 3     glipiZIDE (GLUCOTROL  XL) 10 MG 24 hr tablet TAKE 2 TABLETS BY MOUTH ONCE DAILY 180 tablet 1     lisinopril (ZESTRIL) 10 MG tablet Take 1 tablet (10 mg) by mouth daily 90 tablet 1     metFORMIN (GLUCOPHAGE-XR) 500 MG 24 hr tablet Take 4 tablets (2,000 mg) by mouth daily (with dinner) 360 tablet 0     tamsulosin (FLOMAX) 0.4 MG capsule Take 1 capsule (0.4 mg) by mouth At Bedtime 90 capsule 3       Problem List:  Patient Active Problem List    Diagnosis Date Noted     Urinary hesitancy 07/23/2019     Priority: Medium     Hypertension goal BP (blood pressure) < 140/90 07/23/2019     Priority: Medium     Advanced directives, counseling/discussion 08/02/2018     Priority: Medium     Benign essential hypertension 08/02/2018     Priority: Medium     Hyperlipidemia LDL goal <100 08/07/2012     Priority: Medium     Type 2 diabetes mellitus with hyperglycemia, without long-term current use of insulin (H) 08/07/2012     Priority: Medium     Diagnosis 2012       Tobacco use disorder 08/07/2012     Priority: Medium     Quit 3 months 2011 via [facility] study, restarted due to stress       Overweight 05/14/2012     Priority: Medium     Peripheral neuritis 11/20/2008     Priority: Medium     Impotence 02/19/2007     Priority: Medium     Arthritis 02/19/2007     Priority: Medium        Past Medical/Surgical History:  Past Medical History:   Diagnosis Date     Arthritis 2/19/2007     Benign essential hypertension 8/2/2018     Past Surgical History:   Procedure Laterality Date     COLONOSCOPY       FRACTURE TX, ANKLE RT/LT  1999    Fracture TX Ankle left, 13 surgeries per pt     HERNIA REPAIR, UMBILICAL  1995     ROTATOR CUFF REPAIR RT/LT  ~1990    right and left     VASCULAR SURGERY  >1999    reconstruction post left ankle injury       Social History:  Social History     Socioeconomic History     Marital status:      Spouse name: Lucero     Number of children: 2     Years of education: Not on file     Highest education level: Not on  file   Occupational History     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 No   Social History Narrative     Not on file     Social Determinants of Health     Financial Resource Strain:      Difficulty of Paying Living Expenses:    Food Insecurity:      Worried About Running Out of Food in the Last Year:      Ran Out of Food in the Last Year:    Transportation Needs:      Lack of Transportation (Medical):      Lack of Transportation (Non-Medical):    Physical Activity:      Days of Exercise per Week:      Minutes of Exercise per Session:    Stress:      Feeling of Stress :    Social Connections:      Frequency of Communication with Friends and Family:      Frequency of Social Gatherings with Friends and Family:      Attends Hindu Services:      Active Member of Clubs or Organizations:      Attends Club or Organization Meetings:      Marital Status:    Intimate Partner Violence:      Fear of Current or Ex-Partner:      Emotionally Abused:      Physically Abused:      Sexually Abused:        Family History:  Family History   Problem Relation Age of Onset     Diabetes Mother      Diabetes Father      Diabetes Maternal Grandmother      Diabetes Maternal Grandfather      Diabetes Paternal Grandmother      Diabetes Paternal Grandfather      Cancer - colorectal No family hx of      Prostate Cancer No family hx of        Review of Systems:  A complete review of systems reviewed by me is negative  "with the exeption of what has been mentioned in the history of present illness.  CONSTITUTIONAL:  POSITIVE for  weight loss  EYES: NEGATIVE for changes in vision, blind spots, double vision.  ENT: NEGATIVE for ear pain, sore throat, sinus pain, post-nasal drip, runny nose, bloody nose  CARDIAC: NEGATIVE for fast heartbeats or fluttering in chest, chest pain or pressure, breathlessness when lying flat, swollen legs or swollen feet.  NEUROLOGIC: NEGATIVE headaches, weakness or numbness in the arms or legs.  DERMATOLOGIC: NEGATIVE for rashes, new moles or change in mole(s)  PULMONARY: NEGATIVE SOB at rest, SOB with activity, dry cough, productive cough, coughing up blood, wheezing or whistling when breathing.    GASTROINTESTINAL: NEGATIVE for nausea or vomitting, loose or watery stools, fat or grease in stools, constipation, abdominal pain, bowel movements black in color or blood noted.  GENITOURINARY: NEGATIVE for pain during urination, blood in urine, urinating more frequently than usual, irregular menstrual periods.  MUSCULOSKELETAL: NEGATIVE for muscle pain, bone or joint pain, swollen joints.  ENDOCRINE: NEGATIVE for increased thirst or urination, diabetes.  LYMPHATIC: NEGATIVE for swollen lymph nodes, lumps or bumps in the breasts or nipple discharge.    Physical Examination:  Vitals: Ht 1.854 m (6' 1\")   Wt 88.9 kg (196 lb)   BMI 25.86 kg/m    BMI= Body mass index is 25.86 kg/m .         [place] Total Score 7/20/2021   Total score - [place] 6       [place] Total Score: 6 (07/20/21 1547)      Last Comprehensive Metabolic Panel:  Sodium   Date Value Ref Range Status   07/23/2020 141 133 - 144 mmol/L Final     Potassium   Date Value Ref Range Status   07/23/2020 4.5 3.4 - 5.3 mmol/L Final     Chloride   Date Value Ref Range Status   07/23/2020 109 94 - 109 mmol/L Final     Carbon Dioxide   Date Value Ref Range Status   07/23/2020 25 20 - 32 mmol/L Final     Anion Gap   Date Value Ref Range Status   07/23/2020 7 3 - " 14 mmol/L Final     Glucose   Date Value Ref Range Status   07/23/2020 123 (H) 70 - 99 mg/dL Final     Comment:     Fasting specimen     Urea Nitrogen   Date Value Ref Range Status   07/23/2020 12 7 - 30 mg/dL Final     Creatinine   Date Value Ref Range Status   07/23/2020 0.72 0.66 - 1.25 mg/dL Final     GFR Estimate   Date Value Ref Range Status   07/23/2020 >90 >60 mL/min/[1.73_m2] Final     Comment:     Non  GFR Calc  Starting 12/18/2018, serum creatinine based estimated GFR (eGFR) will be   calculated using the Chronic Kidney Disease Epidemiology Collaboration   (CKD-EPI) equation.       Calcium   Date Value Ref Range Status   07/23/2020 9.0 8.5 - 10.1 mg/dL Final     TSH   Date Value Ref Range Status   07/23/2019 2.79 0.40 - 4.00 mU/L Final       Impression:  Patient has features and risk factors for possible obstructive sleep apnea including: loud snoring, daytime fatigue/sleepiness (ESS 6), difficulty maintaining sleep, co-morbid HTN and DM type 2.  Strong family history of ROVERTO. The STOP-BANG score is 5/8.   The pathophysiology, diagnosis and treatment of ROVERTO was discussed.   Plan:    1. Schedule a Home Sleep Apnea Testing to evaluate for obstructive sleep apnea.    2. Advised him against drowsy driving.    3. Recommend weight management.     Literature provided regarding sleep apnea.      He will follow up with me in approximately two weeks after his sleep study has been completed to review the results and discuss plan of care.       Home Sleep Apnea Testing  reviewed.  Obstructive sleep apnea reviewed.  Complications of untreated sleep apnea were reviewed.    Rafaela Askew PA-C

## 2021-07-20 NOTE — PATIENT INSTRUCTIONS
Your BMI is There is no height or weight on file to calculate BMI.  Weight management is a personal decision.  If you are interested in exploring weight loss strategies, the following discussion covers the approaches that may be successful. Body mass index (BMI) is one way to tell whether you are at a healthy weight, overweight, or obese. It measures your weight in relation to your height.  A BMI of 18.5 to 24.9 is in the healthy range. A person with a BMI of 25 to 29.9 is considered overweight, and someone with a BMI of 30 or greater is considered obese. More than two-thirds of American adults are considered overweight or obese.  Being overweight or obese increases the risk for further weight gain. Excess weight may lead to heart disease and diabetes.  Creating and following plans for healthy eating and physical activity may help you improve your health.  Weight control is part of healthy lifestyle and includes exercise, emotional health, and healthy eating habits. Careful eating habits lifelong are the mainstay of weight control. Though there are significant health benefits from weight loss, long-term weight loss with diet alone may be very difficult to achieve- studies show long-term success with dietary management in less than 10% of people. Attaining a healthy weight may be especially difficult to achieve in those with severe obesity. In some cases, medications, devices and surgical management might be considered.  What can you do?  If you are overweight or obese and are interested in methods for weight loss, you should discuss this with your provider.     Consider reducing daily calorie intake by 500 calories.     Keep a food journal.     Avoiding skipping meals, consider cutting portions instead.    Diet combined with exercise helps maintain muscle while optimizing fat loss. Strength training is particularly important for building and maintaining muscle mass. Exercise helps reduce stress, increase energy,  and improves fitness. Increasing exercise without diet control, however, may not burn enough calories to loose weight.       Start walking three days a week 10-20 minutes at a time    Work towards walking thirty minutes five days a week     Eventually, increase the speed of your walking for 1-2 minutes at time    In addition, we recommend that you review healthy lifestyles and methods for weight loss available through the National Institutes of Health patient information sites:  http://win.niddk.nih.gov/publications/index.htm    And look into health and wellness programs that may be available through your health insurance provider, employer, local community center, or anabel club.    Weight management plan: Patient was referred to their PCP to discuss a diet and exercise plan.    MY CONTACT NUMBERS ARE:  [phone]  DOCTOR : SHAY Perez  SLEEP CENTER :   CPAP EQUIPMENT :    IF I HAVE SLEEP APNEA.....  WHERE CAN I FIND MORE INFORMATION?    American Academy of Sleep Medicine Patient information on sleep disorders:  http://yoursleep.aasmnet.org    THINGS TO REMEMBER  In most situations, sleep apnea is a lifelong disease that must be managed with daily therapy. Untreated disease, when severe, may result in an increased risk for an array of problems from heart disease to mood changes, car accidents and shorter lifespan.    CPAP -  WHY AND HOW?  Continuous positive airway pressure, or CPAP, is the most effective treatment for obstructive sleep apnea. A decision to use CPAP is a major step forward in the pursuit of a healthier life. The successful use of CPAP will help you breathe easier, sleep better and live healthier. Using CPAP can be a positive experience if you keep these [place] points in mind:  1. Commitment  CPAP is not a quick fix for your problem. It involves a long-term commitment to improve your sleep and your health.    2. Communication  Stay in close communication with both your sleep doctor and your  "CPAP supplier. Ask lots of questions and seek help when you need it.    3. Consistency  Use CPAP all night, every night and for every nap. You will receive the maximum health benefits from CPAP when you use it every time that you sleep. This will also make it easier for your body to adjust to the treatment.    4. Correction  The first machine and mask that you try may not be the best ones for you. Work with your sleep doctor and your CPAP supplier to make corrections to your equipment selection. Ask about trying a different type of machine or mask if you have ongoing problems. Make sure that your mask is a good fit and learn to use your equipment properly.    5. Challenge  Tell a family member or close friend to ask you each morning if you used your CPAP the previous night. Have someone to challenge you to give it your best effort.    6. Connection   Your adjustment to CPAP will be easier if you are able to connect with others who use the same treatment. Ask your sleep doctor if there is a support group in your area for people who have sleep apnea, or look for one on the Internet.    7. Comfort   Increase your level of comfort by using a saline spray, decongestant or heated humidifier if CPAP irritates your nose, mouth or throat. Use your unit's \"ramp\" setting to slowly get used to the air pressure level. There may be soft pads you can buy that will fit over your mask straps. Look on www.CPAP.com for accessories such as these straps, a pillow contoured for side-sleeping with CPAP, longer hoses, hose covers to reduce condensation, or stands to keep the hose out of your way.                                 8. Cleaning   Clean your mask, tubing and headgear on a regular basis. Put this time in your schedule so that you don't forget to do it. Check and replace the filters for your CPAP unit and humidifier.    9. Completion   Although you are never finished with CPAP therapy, you should reward yourself by celebrating the " completion of your first month of treatment. Expect this first month to be your hardest period of adjustment. It will involve some trial and error as you find the machine, mask and pressure settings that are right for you.    Continuation  After your first month of treatment, continue to make a daily commitment to use your CPAP all night, every night and for every nap.    CPAP-Tips to starting with success:  Begin using your CPAP for short periods of time during the day while you watch TV or read.    Use CPAP every night and for every nap. Using it less often reduces the health benefits and makes it harder for your body to get used to it.    Newer CPAP models are virtually silent; however, if you find the sound of your CPAP machine to be bothersome, place the unit under your bed to dampen the sound.     Make small adjustments to your mask, tubing, straps and headgear until you get the right fit. Tightening the mask may actually worsen the leak.  If it leaves significant marks on your face or irritates the bridge of your nose, it may not be the best mask for you.  Speak with the person who supplied the mask and consider trying other masks.    Use a saline nasal spray to ease mild nasal congestion. Neti-Pot or saline nasal rinses may also help. Nasal gel sprays can help reduce nasal dryness.  Biotene mouthwash can be helpful to protect your teeth if you experience frequent dry mouth.  Dry mouth may be a sign of air escaping out of your mouth or out of the mask in the case of a full face mask.  Speak with your provider if you expect that is the case.     Take a nasal decongestant to relieve more severe nasal or sinus congestion.  Do not use Afrin (oxymetazoline) nasal spray more than 3 days in a row.  Speak with your sleep doctor if your nasal congestion is chronic.    Use a heated humidifier that fits your CPAP model to enhance your breathing comfort. Adjust the heat setting up if you get a dry nose or throat, down if  you get condensation in the hose or mask.  Position the CPAP lower than you so that any condensation in the hose drains back into the machine rather than towards the mask.    Try a system that uses nasal pillows if traditional masks give you problems.    Clean your mask, tubing and headgear once a week. Make sure the equipment dries fully.    Regularly check and replace the filters for your CPAP unit and humidifier.    Work closely with your sleep doctor and your CPAP supplier to make sure that you have the machine, mask and air pressure setting that works best for you.    BESIDES CPAP, WHAT OTHER THERAPIES ARE THERE?    Postioning devices if you only have the problem on your back    Dental devices if your condition is mild    Nasal valves may be effective though experience is limited    Tongue Retaining Device if missing teeth precludes the use of a dental device    Weight loss if you are overweight    Surgery in limited cases where devices are not acceptable or there are problems with structures in the nose and throat  If treated with one of these alternative options, further evaluation is necessary to ensure that the therapy is effective. This may require some form of testing.     Healthy Lifestyle:  Healthy diet, exercise and Limit alcohol: Not only will excessive alcohol increase your weight over time, but it irritates the throat tissues and make them swell, shrinking the airway and causing snoring. Drinking alcohol should be limited and stopped within 3-4 hours before going to bed.   Stop smoking: (Red swollen throat, heat, nicotine), also irritates and swells the airway, among numerous other negative health consequences.    Positioning Device  This example shows a pillow that straps around the waist. It may be appropriate for those whose sleep study shows milder sleep apnea that occurs primarily when lying flat on one's back. Preliminary studies have shown benefit but effectiveness at home should be  verified.    Nasal Valves              Nasal valves may not be effective if you have frequent nasal congestion or have difficulty breathing through your nose. They may be an option for mild apnea if other options are not well tolerated. The efficacy of these devices is generally less than CPAP or oral appliances.  Oral Appliance  These are examples of two of many custom-made devices that are more likely to work in mild sleep apnea  Oral appliances are dental mouth pieces that fit very much like a sports mouth guards or removable orthodontic retainers. They are used to treat snoring  And obstructive sleep apnea . The device prevents the airway from collapsing by either holding the tongue or supporting the jaw in a forward position. Since oral appliances are non-invasive and easy to use, they may be considered as an early treatment option. Oral appliance therapy (OAT) involves the customization, selection, fabrication, fitting, adjustments and long-term follow-up care of specially designed oral devices, worn during sleep, which reposition the lower jaw and tongue base forward to maintain an open airway.  Custom made oral appliances are proven to be more effective than over-the-counter devices. Therefore, the over-the-counter devices are recommended not to be used as a screening tool nor as a therapeutic option.  Who gets a dental device?  Oral appliance therapy can be used as an alternative to  CPAP therapy for the treatment of mild to moderate sleep apnea and for those patients who prefer OAT to CPAP. Oral appliance therapy is a first line therapy for the treatment of primary snoring. Additionally, OAT is an option for those that cannot tolerate CPAP as therapy or who have experienced insufficient surgical results.    Possible side effects?  Frequent but minor side effects include: excessive salivation, dry mouth, discomfort of teeth and jaw and temporary changes in the patient s bite.  Potential complications  include: jaw pain, permanent occlusal changes and TMJ symptoms.  The above mentioned side effects and complications can be recognized and managed by dentists trained in dental sleep medicine.    Finding a dentist that practices dental sleep medicine  Specific training is available through the American Academy of Dental Sleep Medicine for dentists interested in working in the field of sleep. To find a dentist who is educated in the field of sleep and the use of oral appliances, near you, visit the Web site of the American Academy of Dental Sleep Medicine; also see http://www.accpstorage.org/newOrganization/patients/oralAppliances.pdf   To search for a dentist certified in these practices:  Http://aadsm.org/FindADentist.aspx?1  Http://www.accpstorage.org/newOrganization/patients/oralAppliances.pdf    Tongue Retaining Device               Tongue Retaining Devices are devices that generally  suction cup  onto the tongue preventing it from falling back into the back of the throat during sleep.  They may be an option for people missing teeth, but can be uncomfortable. This particular device can be purchased online, but similar devices made by dentists fit more precisely and may be tolerated better. In general, they are rarely effective and often not very well tolerated.    Weight Loss:  Some patients may experience reduction or elimination of sleep apnea with weight loss.  Though there are significant health benefits from weight loss, long-term weight loss is very difficult to achieve- studies show success with dietary management in less that 10% of people.     If you are interested in dietary weight loss, you should review the options discussed at the National Institutes of Health patient information site:     Http:/www.health.nih.gov/topic/WeightLossDieting    Bariatric programs offer counseling in all methods of weight  "loss:    Http:/www.[facility].org/Specialties/WeightLossSurgeryandMedicalMgmt/htm    Surgery:  There are a number of surgeries that have been attempted to treat apnea. In general, surgical options are usually reserved for cases in which there is a physical abnormality contributing to obstruction or other treatment options are ineffective or not tolerated. Most surgical options are either unreliable or quite invasive. One of the more common procedures is:  Uvulopalatopharyngoplasty: In this procedure, the uvula (the finger-like tissue that hangs in the back of the throat), part of the soft palate (the tissue that the uvula is attached to), and sometimes the tonsils or adenoids are removed. The efficacy of this surgery is around 30-50% .  After surgery, complications may include:  Sleepiness and sleep apnea related to post-surgery medication   Swelling, infection and bleeding   A sore throat and/or difficulty swallowing   Drainage of secretions into the nose and a nasal quality to the voice. English language speech does not seem to be affected by this surgery.   Narrowing of the airway in the nose and throat (hence constricting breathing) snoring and even iatrogenically caused sleep apnea. By cutting the tissues, excess scar tissue can \"tighten\" the airway and make it even smaller than it was before UPPP.  Patients who have had the uvula removed will become unable to correctly speak [place] or any other language that has a uvular 'r' phoneme.    Surgeries to help resolve nasal congestion may help reduce the severity of apnea slightly. Nasal congestion does not cause apnea on its own, so these surgeries are usually not performed just for ROVERTO.  They may be worth considering if the nasal congestion is significantly bothersome independent of apnea.    "

## 2021-07-21 ENCOUNTER — VIRTUAL VISIT (OUTPATIENT)
Dept: SLEEP MEDICINE | Facility: CLINIC | Age: 61
End: 2021-07-21
Payer: COMMERCIAL

## 2021-07-21 VITALS — HEIGHT: 73 IN | WEIGHT: 196 LBS | BODY MASS INDEX: 25.98 KG/M2

## 2021-07-21 DIAGNOSIS — Z72.820 LACK OF ADEQUATE SLEEP: ICD-10-CM

## 2021-07-21 DIAGNOSIS — R06.89 DYSPNEA AND RESPIRATORY ABNORMALITY: Primary | ICD-10-CM

## 2021-07-21 DIAGNOSIS — R06.00 DYSPNEA AND RESPIRATORY ABNORMALITY: Primary | ICD-10-CM

## 2021-07-21 DIAGNOSIS — R53.81 MALAISE AND FATIGUE: ICD-10-CM

## 2021-07-21 DIAGNOSIS — I10 ESSENTIAL HYPERTENSION: ICD-10-CM

## 2021-07-21 DIAGNOSIS — R53.83 MALAISE AND FATIGUE: ICD-10-CM

## 2021-07-21 PROBLEM — R39.11 URINARY HESITANCY: Chronic | Status: ACTIVE | Noted: 2019-07-23

## 2021-07-21 PROCEDURE — 99203 OFFICE O/P NEW LOW 30 MIN: CPT | Mod: 95 | Performed by: PHYSICIAN ASSISTANT

## 2021-07-31 ENCOUNTER — TELEPHONE (OUTPATIENT)
Dept: FAMILY MEDICINE | Facility: CLINIC | Age: 61
End: 2021-07-31

## 2021-07-31 DIAGNOSIS — E11.65 TYPE 2 DIABETES MELLITUS WITH HYPERGLYCEMIA, WITHOUT LONG-TERM CURRENT USE OF INSULIN (H): ICD-10-CM

## 2021-08-02 RX ORDER — GLIPIZIDE 10 MG/1
TABLET, FILM COATED, EXTENDED RELEASE ORAL
Qty: 180 TABLET | Refills: 0 | Status: SHIPPED | OUTPATIENT
Start: 2021-08-02 | End: 2021-11-13

## 2021-08-02 NOTE — TELEPHONE ENCOUNTER
Medication is being filled for 1 time refill only due to:  Patient needs to be seen because need diabetic recheck/labs.   Please schedule.  Nay George RN

## 2021-08-03 NOTE — TELEPHONE ENCOUNTER
Patient called back, and stated that he has his labs done as part of a wellness program for work.    They already have orders there. He would just need to schedule with them.    Lab Results   Component Value Date    A1C 7.4 12/29/2020    A1C 7.4 07/23/2020    A1C 7.5 07/23/2019    A1C 7.8 03/28/2019    A1C 8.9 08/02/2018     Patient was advised last visit to recheck 3 months    Routed to PCP to advise if patient should see provider here as well, and what labs he needs to have completed

## 2021-08-04 NOTE — TELEPHONE ENCOUNTER
Called [phone] (home)  Did patient answer the phone: No, left a message with a female to have patient return call to the [facility] at [phone].    Elizabeth Quach, RN  Triage Nurse  [facility]: [facility]

## 2021-08-04 NOTE — TELEPHONE ENCOUNTER
I'd like him to have an a1c. That's it. But I do want an in person or virtual visit after the a1c to discuss his diabetes management.

## 2021-08-05 NOTE — TELEPHONE ENCOUNTER
Spoke with wife, (consent in chart) gave below information and instructions, she voiced understanding and agreement.  Nicole Bolanos RN  [facility]

## 2021-08-30 DIAGNOSIS — E11.65 TYPE 2 DIABETES MELLITUS WITH HYPERGLYCEMIA, WITHOUT LONG-TERM CURRENT USE OF INSULIN (H): ICD-10-CM

## 2021-09-01 ENCOUNTER — OFFICE VISIT (OUTPATIENT)
Dept: SLEEP MEDICINE | Facility: CLINIC | Age: 61
End: 2021-09-01
Payer: COMMERCIAL

## 2021-09-01 DIAGNOSIS — Z72.820 LACK OF ADEQUATE SLEEP: ICD-10-CM

## 2021-09-01 DIAGNOSIS — R06.00 DYSPNEA AND RESPIRATORY ABNORMALITY: ICD-10-CM

## 2021-09-01 DIAGNOSIS — R53.81 MALAISE AND FATIGUE: ICD-10-CM

## 2021-09-01 DIAGNOSIS — R53.83 MALAISE AND FATIGUE: ICD-10-CM

## 2021-09-01 DIAGNOSIS — I10 ESSENTIAL HYPERTENSION: ICD-10-CM

## 2021-09-01 DIAGNOSIS — R06.89 DYSPNEA AND RESPIRATORY ABNORMALITY: ICD-10-CM

## 2021-09-01 RX ORDER — METFORMIN HCL 500 MG
2000 TABLET, EXTENDED RELEASE 24 HR ORAL
Qty: 360 TABLET | Refills: 0 | Status: SHIPPED | OUTPATIENT
Start: 2021-09-01 | End: 2021-11-13

## 2021-09-01 RX ORDER — GLIPIZIDE 10 MG/1
TABLET, FILM COATED, EXTENDED RELEASE ORAL
Qty: 180 TABLET | Refills: 0 | OUTPATIENT
Start: 2021-09-01

## 2021-09-01 NOTE — TELEPHONE ENCOUNTER
Medication is being filled for 1 time refill only due to:  Patient needs to be seen because needs recheck.   Nay George RN

## 2021-09-02 ENCOUNTER — APPOINTMENT (OUTPATIENT)
Dept: SLEEP MEDICINE | Facility: CLINIC | Age: 61
End: 2021-09-02
Payer: COMMERCIAL

## 2021-09-03 NOTE — PROGRESS NOTES
This HSAT was performed using a Noxturnal T3 device which recorded snore, sound, movement activity, body position, nasal pressure, oronasal thermal airflow, pulse, oximetry and both chest and abdominal respiratory effort. HSAT data was restricted to the time patient states they were in bed.     HSAT was scored using 1B 4% hypopnea rule.     HST AHI (Non-PAT): 4  Snoring was reported as mild.  Time with SpO2 below 89% was 0.2 minutes.   Overall signal quality was fair     Pt will follow up with sleep provider to determine appropriate therapy.

## 2021-09-03 NOTE — PROGRESS NOTES
HST POST-STUDY QUESTIONNAIRE    1. What time did you go to bed?  2300  2. How long do you think it took to fall asleep?  1/2 hr  3. What time did you wake up to start the day?  0700  4. Did you get up during the night at all?  Yes once  5. If you woke up, do you remember approximately what time(s)? 0230 0530  6. Did you have any difficulty with the equipment?  Yes the finger thing came off  7. Did you us any type of treatment with this study?  None  8. Was the head of the bed elevated? No  9. Did you sleep in a recliner?  No  10. Did you stop using CPAP at least 3 days before this test?  NA  11. Any other information you'd like us to know? no

## 2021-09-07 PROBLEM — Z71.89 ADVANCED DIRECTIVES, COUNSELING/DISCUSSION: Chronic | Status: ACTIVE | Noted: 2018-08-02

## 2021-09-08 ENCOUNTER — TELEPHONE (OUTPATIENT)
Dept: SLEEP MEDICINE | Facility: CLINIC | Age: 61
End: 2021-09-08

## 2021-09-08 NOTE — PROGRESS NOTES
The HST was returned but did not record properly.  Patient will need to be called to the repeat test. Staff was notified test is a redo and charges were deleted.

## 2021-10-13 ENCOUNTER — OFFICE VISIT (OUTPATIENT)
Dept: SLEEP MEDICINE | Facility: CLINIC | Age: 61
End: 2021-10-13
Payer: COMMERCIAL

## 2021-10-13 DIAGNOSIS — R06.83 SNORING: Primary | ICD-10-CM

## 2021-10-13 PROCEDURE — G0399 HOME SLEEP TEST/TYPE 3 PORTA: HCPCS | Performed by: INTERNAL MEDICINE

## 2021-10-14 ENCOUNTER — APPOINTMENT (OUTPATIENT)
Dept: SLEEP MEDICINE | Facility: CLINIC | Age: 61
End: 2021-10-14
Payer: COMMERCIAL

## 2021-10-14 NOTE — PROGRESS NOTES
This HSAT was performed using a Noxturnal T3 device which recorded snore, sound, movement activity, body position, nasal pressure, oronasal thermal airflow, pulse, oximetry and both chest and abdominal respiratory effort. HSAT data was restricted to the time patient states they were in bed.     HSAT was scored using 1B 4% hypopnea rule.     HST AHI (Non-PAT): 4.6  Snoring was reported as moderate.  Time with SpO2 below 89% was 0.7 minutes.   Overall signal quality was good     Pt will follow up with sleep provider to determine appropriate therapy.

## 2021-10-15 NOTE — PROGRESS NOTES
HST POST-STUDY QUESTIONNAIRE    1. What time did you go to bed?  0840  2. How long do you think it took to fall asleep?  1/2 hour  3. What time did you wake up to start the day?  0710  4. Did you get up during the night at all?  no  5. If you woke up, do you remember approximately what time(s)? About 4am  6. Did you have any difficulty with the equipment?  No  7. Did you us any type of treatment with this study?  None  8. Was the head of the bed elevated? No  9. Did you sleep in a recliner?  No  10. Did you stop using CPAP at least 3 days before this test?  NA  11. Any other information you'd like us to know? no

## 2021-10-19 NOTE — PROCEDURES
"HOME SLEEP STUDY INTERPRETATION    Patient: Waqar Troncoso  MRN: [id number]  YOB: 1960  Study Date: 10/13/2021  Referring Provider: Tip Romero;  Ordering Provider: SHAY Lawson     Indications for Home Study: Waqar Troncoso is a 61 year old male with symptoms suggestive of obstructive sleep apnea.    Estimated body mass index is 25.86 kg/m  as calculated from the following:    Height as of 7/20/21: 1.854 m (6' 1\").    Weight as of 7/20/21: 88.9 kg (196 lb).  Total score - [place]: 6 (7/20/2021  3:47 PM)      Data: A full night home sleep study was performed recording the standard physiologic parameters including body position, movement, sound, nasal pressure, thermal oral airflow, chest and abdominal movements with respiratory inductance plethysmography, and oxygen saturation by pulse oximetry. Pulse rate was estimated by oximetry recording. This study was considered adequate based on > 4 hours of quality oximetry and respiratory recording. As specified by the AASM Manual for the Scoring of Sleep and Associated events, version 2.3, Rule VIII.D 1B, 4% oxygen desaturation scoring for hypopneas is used as a standard of care on all home sleep apnea testing.    Analysis Time:  466 minutes    Respiration:   Sleep Associated Hypoxemia: sustained hypoxemia was not present. Baseline oxygen saturation was 94%.  Time with saturation less than or equal to 88% was 0.7 minutes. The lowest oxygen saturation was 84%.   Snoring: Snoring was present.  Respiratory events: The home study revealed a presence of 21 obstructive apneas and 1 mixed and central apneas. There were 14 hypopneas resulting in a combined apnea/hypopnea index [AHI] of 4.6 events per hour.  AHI was 24.8 per hour supine, n/a per hour prone, 1.3 per hour on left side, and 0.8 per hour on right side.   Pattern: Excluding events noted above, respiratory rate and pattern was Normal.    Position: Percent of time spent: supine - 15%, prone - 0%, " on left - 51%, on right - 33%.    Heart Rate: By pulse oximetry normal rate was noted.     Assessment:   Evidence of  sleep disordered breathing in the supine position.  Sleep associated hypoxemia was not present.    Recommendations:  Consider positional therapy.  Consider attended Polysomnogram with attention to supine positioning  Suggest optimizing sleep hygiene and avoiding sleep deprivation.  Weight management.    Diagnosis Code(s): Snoring R06.83    Reji Workman MD, October 19, 2021   Diplomate, American Board of Internal Medicine, Sleep Medicine

## 2021-11-04 ENCOUNTER — TRANSFERRED RECORDS (OUTPATIENT)
Dept: HEALTH INFORMATION MANAGEMENT | Facility: CLINIC | Age: 61
End: 2021-11-04
Payer: COMMERCIAL

## 2021-11-04 LAB
ALBUMIN (URINE) MG/SPEC: 128.4 MG/DL
ALBUMIN/CREATININE RATIO: 45 MG/G
CREATININE (URINE): 285 MG/DL
HBA1C MFR BLD: 8.2 %

## 2021-12-06 ENCOUNTER — OFFICE VISIT (OUTPATIENT)
Dept: FAMILY MEDICINE | Facility: CLINIC | Age: 61
End: 2021-12-06
Payer: COMMERCIAL

## 2021-12-06 VITALS
OXYGEN SATURATION: 98 % | DIASTOLIC BLOOD PRESSURE: 77 MMHG | TEMPERATURE: 97.3 F | BODY MASS INDEX: 27.63 KG/M2 | SYSTOLIC BLOOD PRESSURE: 134 MMHG | HEIGHT: 72 IN | HEART RATE: 65 BPM | WEIGHT: 204 LBS

## 2021-12-06 DIAGNOSIS — Z00.00 ROUTINE GENERAL MEDICAL EXAMINATION AT A HEALTH CARE FACILITY: Primary | ICD-10-CM

## 2021-12-06 DIAGNOSIS — E11.65 TYPE 2 DIABETES MELLITUS WITH HYPERGLYCEMIA, WITHOUT LONG-TERM CURRENT USE OF INSULIN (H): ICD-10-CM

## 2021-12-06 DIAGNOSIS — E78.5 HYPERLIPIDEMIA LDL GOAL <100: ICD-10-CM

## 2021-12-06 DIAGNOSIS — I10 HYPERTENSION GOAL BP (BLOOD PRESSURE) < 140/90: ICD-10-CM

## 2021-12-06 PROCEDURE — 99396 PREV VISIT EST AGE 40-64: CPT | Performed by: PHYSICIAN ASSISTANT

## 2021-12-06 PROCEDURE — 99214 OFFICE O/P EST MOD 30 MIN: CPT | Mod: 25 | Performed by: PHYSICIAN ASSISTANT

## 2021-12-06 ASSESSMENT — ENCOUNTER SYMPTOMS
CHILLS: 0
PALPITATIONS: 0
NAUSEA: 0
DIARRHEA: 1
SHORTNESS OF BREATH: 0
HEMATURIA: 0
FEVER: 0
NERVOUS/ANXIOUS: 0
PARESTHESIAS: 0
ARTHRALGIAS: 0
FREQUENCY: 0
COUGH: 1
JOINT SWELLING: 0
CONSTIPATION: 0
DYSURIA: 0
HEARTBURN: 0
MYALGIAS: 0
HEMATOCHEZIA: 0
DIZZINESS: 0
ABDOMINAL PAIN: 0
SORE THROAT: 0
HEADACHES: 0
EYE PAIN: 0
WEAKNESS: 0

## 2021-12-06 ASSESSMENT — MIFFLIN-ST. JEOR: SCORE: 1773.34

## 2021-12-20 ENCOUNTER — TELEPHONE (OUTPATIENT)
Dept: FAMILY MEDICINE | Facility: CLINIC | Age: 61
End: 2021-12-20
Payer: COMMERCIAL

## 2021-12-20 DIAGNOSIS — E11.65 TYPE 2 DIABETES MELLITUS WITH HYPERGLYCEMIA, WITHOUT LONG-TERM CURRENT USE OF INSULIN (H): Primary | ICD-10-CM

## 2021-12-20 NOTE — TELEPHONE ENCOUNTER
Patient calling in to report that his empagliflozin is $580 and he cannot afford that. He is wondering if there is a cheaper option. He stated the pharmacy did not give him an alternative option.     Graciela Burroughs RN

## 2021-12-24 NOTE — TELEPHONE ENCOUNTER
Called [phone] (home)     Did patient answer the phone: No, left a message with patients wife to return call to triage at [phone].    Elizabeth RN,BSN  Triage Nurse  [facility]: [facility]

## 2021-12-24 NOTE — TELEPHONE ENCOUNTER
Would like  try him on a once weekly med called ozempic or trulicity. This is to be injected once a week. It's not insulin. If he's ok with this, let me know and i'll prescribe it. We don't have many more options other than insulin.

## 2021-12-27 NOTE — TELEPHONE ENCOUNTER
Patient called back, he has never done injections before but is willing to try.   Pharmacy selected, routed to PCP to send Rx.  I removed the jardiance from med list as patient not taking that due to expense.    Zoya Geronimo RN  [facility]

## 2021-12-28 RX ORDER — DULAGLUTIDE 0.75 MG/.5ML
0.75 INJECTION, SOLUTION SUBCUTANEOUS
Qty: 6 ML | Refills: 0 | Status: SHIPPED | OUTPATIENT
Start: 2021-12-28

## 2021-12-28 NOTE — TELEPHONE ENCOUNTER
Patient informed of rx.  Went over instructions on where to inject in subcutaneous tissue.  Patient verbalized understanding and stated he will call clinic back if he has any further questions

## 2022-03-02 DIAGNOSIS — E11.65 TYPE 2 DIABETES MELLITUS WITH HYPERGLYCEMIA, WITHOUT LONG-TERM CURRENT USE OF INSULIN (H): ICD-10-CM

## 2022-03-04 RX ORDER — GLIPIZIDE 10 MG/1
TABLET, FILM COATED, EXTENDED RELEASE ORAL
Qty: 180 TABLET | Refills: 0 | Status: SHIPPED | OUTPATIENT
Start: 2022-03-04

## 2022-03-16 ENCOUNTER — TELEPHONE (OUTPATIENT)
Dept: FAMILY MEDICINE | Facility: CLINIC | Age: 62
End: 2022-03-16
Payer: COMMERCIAL

## 2023-08-23 NOTE — PROGRESS NOTES
Subjective     Waqar Troncoso is a 60 year old male who presents to clinic today for the following health issues:    HPI         Diabetes Follow-up      How often are you checking your blood sugar? Not at all    What concerns do you have today about your diabetes? None     Do you have any of these symptoms? (Select all that apply)  No numbness or tingling in feet.  No redness, sores or blisters on feet.  No complaints of excessive thirst.  No reports of blurry vision.  No significant changes to weight.    Have you had a diabetic eye exam in the last 12 months? No, but is scheduled  Blood pressure well controlled.   Very active.   Still noting ED.   Sildenafil has not been helpful.    BP Readings from Last 2 Encounters:   12/29/20 115/63   07/23/20 129/83     Hemoglobin A1C (%)   Date Value   12/29/2020 7.4 (H)   07/23/2020 7.4 (H)     LDL Cholesterol Calculated (mg/dL)   Date Value   07/23/2020 38   07/23/2019 52           How many servings of fruits and vegetables do you eat daily?  2-3    On average, how many sweetened beverages do you drink each day (Examples: soda, juice, sweet tea, etc.  Do NOT count diet or artificially sweetened beverages)?   0    How many days per week do you exercise enough to make your heart beat faster? 3 or less, but is constantly walking and going up and down stairs at work    How many minutes a day do you exercise enough to make your heart beat faster? 9 or less    How many days per week do you miss taking your medication? 0        Review of Systems   Constitutional, HEENT, cardiovascular, pulmonary, GI, , musculoskeletal, neuro, skin, endocrine and psych systems are negative, except as otherwise noted.      Objective    /63   Pulse 64   Temp 96.6  F (35.9  C) (Tympanic)   Resp 20   Wt 89.2 kg (196 lb 9.6 oz)   SpO2 98%   BMI 25.94 kg/m    Body mass index is 25.94 kg/m .  Physical Exam   Eye exam - right eye normal lid, conjunctiva, cornea, pupil and fundus, left eye  normal lid, conjunctiva, cornea, pupil and fundus.  Thyroid not palpable, not enlarged, no nodules detected.  CHEST:chest clear to IPPA, no tachypnea, retractions or cyanosis and S1, S2 normal, no murmur, no gallop, rate regular.  Foot exam - both sides normal; no swelling, tenderness or skin or vascular lesions. Color and temperature is normal. Sensation is intact. Peripheral pulses are palpable. Toenails are normal.    Waqar was seen today for diabetes.    Diagnoses and all orders for this visit:    Type 2 diabetes mellitus with hyperglycemia, without long-term current use of insulin (H)  -     Hemoglobin A1c  -     JUST IN CASE  -     glipiZIDE (GLUCOTROL XL) 10 MG 24 hr tablet; TAKE 2 TABLETS BY MOUTH ONCE DAILY  -     metFORMIN (GLUCOPHAGE) 1000 MG tablet; TAKE 1 TABLET BY MOUTH TWICE DAILY WITH MEALS  -     AMBULATORY ADULT DIABETES EDUCATOR REFERRAL; Future    Hypertension goal BP (blood pressure) < 140/90  -     lisinopril (ZESTRIL) 10 MG tablet; Take 1 tablet (10 mg) by mouth daily    Hyperlipidemia LDL goal <100  -     atorvastatin (LIPITOR) 80 MG tablet; Take 1 tablet (80 mg) by mouth daily    Other male erectile dysfunction  -     UROLOGY ADULT REFERRAL; Future      work on lifestyle modification  Recheck in 3 mos    5 (moderate pain)

## 2024-01-31 ENCOUNTER — THERAPY VISIT (OUTPATIENT)
Dept: PHYSICAL THERAPY | Facility: CLINIC | Age: 64
End: 2024-01-31
Payer: COMMERCIAL

## 2024-01-31 DIAGNOSIS — Z98.890 S/P SHOULDER SURGERY: Primary | ICD-10-CM

## 2024-01-31 DIAGNOSIS — M25.512 LEFT SHOULDER PAIN: ICD-10-CM

## 2024-01-31 PROCEDURE — 97161 PT EVAL LOW COMPLEX 20 MIN: CPT | Mod: GP | Performed by: PHYSICAL THERAPIST

## 2024-01-31 PROCEDURE — 97110 THERAPEUTIC EXERCISES: CPT | Mod: GP | Performed by: PHYSICAL THERAPIST

## 2024-02-05 ENCOUNTER — THERAPY VISIT (OUTPATIENT)
Dept: PHYSICAL THERAPY | Facility: CLINIC | Age: 64
End: 2024-02-05
Payer: COMMERCIAL

## 2024-02-05 DIAGNOSIS — M25.512 LEFT SHOULDER PAIN: ICD-10-CM

## 2024-02-05 DIAGNOSIS — Z98.890 S/P SHOULDER SURGERY: Primary | ICD-10-CM

## 2024-02-05 PROCEDURE — 97112 NEUROMUSCULAR REEDUCATION: CPT | Mod: GP | Performed by: PHYSICAL THERAPIST

## 2024-02-05 PROCEDURE — 97110 THERAPEUTIC EXERCISES: CPT | Mod: GP | Performed by: PHYSICAL THERAPIST

## 2024-02-11 NOTE — PROGRESS NOTES
"PHYSICAL THERAPY EVALUATION  Type of Visit: Evaluation   \"Frank\" is a pleasant 64 yo male referred to physical therapy under the direction of Dr. Morel, s/p L Massive RCR , SAD, DEC on 11/07/2023. 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 He was immobilized for 10 weeks. Plan to advance massive RCR under the direction of his MD. Frank will be seen on a weekly basis for the next 12 weeks unless directed otherwise by Dr. Morel.     See electronic medical record for Abuse and Falls Screening details.    Subjective       Presenting condition or subjective complaint:  L Massive RCR   Date of onset: 11/07/23    Relevant medical history:     Dates & types of surgery:  11/07/2023 L RCR , Hx of R RCR several years ago as well.     Prior diagnostic imaging/testing results:     x-Ray, MRI   Prior therapy history for the same diagnosis, illness or injury:    nil     Prior Level of Function  Transfers: Independent  Ambulation: Independent  ADL: Independent  IADL:     Living Environment    Employment:       Hobbies/Interests:  hunting, fishing, outdoor sports     Patient goals for therapy:  restore strength and function of the L arm to allow RTW and his daily life without pain and weakness      Objective   SHOULDER EVALUATION  PAIN: Pain is Exacerbated By: reaching, lifting, sleeping on the L at night, personal cares   Pain is Relieved By: rest  INTEGUMENTARY (edema, incisions): WNL, incision is well healed and clean   POSTURE:  poor, forward head, rounded shoulders, thoracic kyphosis   GAIT:   Weightbearing Status: WBAT  Assistive Device(s): None  Gait Deviations: WNL  ROM:   (Degrees) Left AROM Left PROM Right AROM  Right PROM   Shoulder Flexion  130  170   Shoulder Extension       Shoulder Abduction  90  160   Shoulder Adduction       Shoulder Internal Rotation  50  80   Shoulder External Rotation  20  65                                             Pain:   End feel:     CERVICAL SCREEN:  clear "     Assessment & Plan   CLINICAL IMPRESSIONS  Medical Diagnosis: Massive RCR    Treatment Diagnosis: Massive RCR   Impression/Assessment: Patient is a 63 year old male with s/p L Massive RCR  complaints.  The following significant findings have been identified: Pain, Decreased ROM/flexibility, and Decreased strength. These impairments interfere with their ability to perform self care tasks, work tasks, household chores, driving , household mobility, and community mobility as compared to previous level of function.     Clinical Decision Making (Complexity):  Clinical Presentation: Stable/Uncomplicated  Clinical Presentation Rationale: based on medical and personal factors listed in PT evaluation  Clinical Decision Making (Complexity): Low complexity    PLAN OF CARE  Treatment Interventions:  Modalities: Cryotherapy  Interventions: Manual Therapy, Neuromuscular Re-education, Therapeutic Activity, Therapeutic Exercise, Self-Care/Home Management    Long Term Goals     PT Goal 1  Goal Identifier: reaching  Goal Description: Ability to actively reach to 150-170 degrees comparable to the opposite side  Rationale: to maximize safety and independence with self cares;to maximize safety and independence with performance of ADLs and functional tasks;to maximize safety and independence within the home  Target Date: 04/24/24      Frequency of Treatment: 2x/week  Duration of Treatment: 12 weeks    Recommended Referrals to Other Professionals:   Education Assessment:   Learner/Method: Patient;Demonstration;Pictures/Video;No Barriers to Learning    Risks and benefits of evaluation/treatment have been explained.   Patient/Family/caregiver agrees with Plan of Care.     Evaluation Time:     PT Eval, Low Complexity Minutes (41113): 15       Signing Clinician: Rod Romero PT

## 2024-02-15 ENCOUNTER — THERAPY VISIT (OUTPATIENT)
Dept: PHYSICAL THERAPY | Facility: CLINIC | Age: 64
End: 2024-02-15
Payer: COMMERCIAL

## 2024-02-15 DIAGNOSIS — M25.512 LEFT SHOULDER PAIN: ICD-10-CM

## 2024-02-15 DIAGNOSIS — Z98.890 S/P SHOULDER SURGERY: Primary | ICD-10-CM

## 2024-02-15 PROCEDURE — 97110 THERAPEUTIC EXERCISES: CPT | Mod: GP | Performed by: PHYSICAL THERAPIST

## 2024-02-15 PROCEDURE — 97112 NEUROMUSCULAR REEDUCATION: CPT | Mod: GP | Performed by: PHYSICAL THERAPIST

## 2024-02-21 ENCOUNTER — THERAPY VISIT (OUTPATIENT)
Dept: PHYSICAL THERAPY | Facility: CLINIC | Age: 64
End: 2024-02-21
Payer: COMMERCIAL

## 2024-02-21 DIAGNOSIS — Z98.890 S/P SHOULDER SURGERY: Primary | ICD-10-CM

## 2024-02-21 DIAGNOSIS — M25.512 LEFT SHOULDER PAIN: ICD-10-CM

## 2024-02-21 PROCEDURE — 97110 THERAPEUTIC EXERCISES: CPT | Mod: GP | Performed by: PHYSICAL THERAPIST

## 2024-02-21 PROCEDURE — 97112 NEUROMUSCULAR REEDUCATION: CPT | Mod: GP | Performed by: PHYSICAL THERAPIST

## 2024-03-06 ENCOUNTER — THERAPY VISIT (OUTPATIENT)
Dept: PHYSICAL THERAPY | Facility: CLINIC | Age: 64
End: 2024-03-06
Payer: COMMERCIAL

## 2024-03-06 DIAGNOSIS — Z98.890 S/P SHOULDER SURGERY: Primary | ICD-10-CM

## 2024-03-06 PROCEDURE — 97110 THERAPEUTIC EXERCISES: CPT | Mod: GP | Performed by: PHYSICAL THERAPIST

## 2024-03-13 ENCOUNTER — THERAPY VISIT (OUTPATIENT)
Dept: PHYSICAL THERAPY | Facility: CLINIC | Age: 64
End: 2024-03-13
Payer: COMMERCIAL

## 2024-03-13 DIAGNOSIS — Z98.890 S/P SHOULDER SURGERY: Primary | ICD-10-CM

## 2024-03-13 DIAGNOSIS — M25.512 LEFT SHOULDER PAIN: ICD-10-CM

## 2024-03-13 PROCEDURE — 97110 THERAPEUTIC EXERCISES: CPT | Mod: GP | Performed by: PHYSICAL THERAPIST

## 2024-03-13 PROCEDURE — 97112 NEUROMUSCULAR REEDUCATION: CPT | Mod: GP | Performed by: PHYSICAL THERAPIST

## 2024-03-17 NOTE — PROGRESS NOTES
PLAN  Continue therapy per current plan of care.     03/13/24 0500   Appointment Info   Signing clinician's name / credentials Tyler Romero PT ATC Cert MDT   Total/Authorized Visits 12   Visits Used 6   Medical Diagnosis Massive RCR   PT Tx Diagnosis Massive RCR   Other pertinent information SPADI  30/100   Progress Note/Certification   Onset of illness/injury or Date of Surgery 11/07/23   Therapy Frequency 2x/week   Predicted Duration 12 weeks   Progress Note Due Date 03/01/24   Progress Note Completed Date 03/13/24       Present No   PT Goal 1   Goal Identifier reaching   Goal Description Ability to actively reach to 150-170 degrees comparable to the opposite side   Rationale to maximize safety and independence with self cares;to maximize safety and independence with performance of ADLs and functional tasks;to maximize safety and independence within the home   Goal Progress AROM 160, with scap hike   Target Date 04/24/24   Subjective Report   Subjective Report 16-18 weeks post op. Gradually gaining function and pain relief. Still limited from lifting greater than 10# and terminal reach overhead. He is working FT with restrictions.   Objective Measures   Objective Measures Objective Measure 1;Objective Measure 2;Objective Measure 3   Objective Measure 1   Objective Measure Shoulder ROM   Details flexion   160 , ER 55,  IR 90 vs 170, 80, 85.   Objective Measure 2   Objective Measure Strength   Details weak ER and abduction   Objective Measure 3   Objective Measure scap control   Details slight hike and wing with elevation greater than 90 degrees   Treatment Interventions (PT)   Interventions Therapeutic Procedure/Exercise;Therapeutic Activity;Neuromuscular Re-education;Gait Training;Manual Therapy   Therapeutic Procedure/Exercise   Therapeutic Procedures: strength, endurance, ROM, flexibility minutes (65024) 20   Therapeutic Procedures Ther Proc 2;Ther Proc 3;Ther Proc 5;Ther Proc 4;Ther Proc  7;Ther Proc 6;Ther Proc 8;Ther Proc 9   Ther Proc 1 wand flexion, abduction, ER, IR , extension, posterior reach   Ther Proc 1 - Details x 5-10   Ther Proc 3 ER, sidelying   Ther Proc 3 - Details x 30   Ther Proc 4 Next : flexion isometric   Ther Proc 4 - Details 5 sec x 10   Ther Proc 5 Next : abduction isometric   Ther Proc 5 - Details 5 sec x 10   Ther Proc 6 Next : abduction isometric   Ther Proc 6 - Details 5 sec x 10   Ther Proc 7 Manual ROM all direction stretch   Ther Proc 7 - Details x 8-10 reps   Skilled Intervention manual and verbal   Patient Response/Progress fatigue   Neuromuscular Re-education   Neuromuscular re-ed of mvmt, balance, coord, kinesthetic sense, posture, proprioception minutes (77482) 20   Neuromuscular Re-education Neuro Re-ed 2;Neuro Re-ed 4;Neuro Re-ed 3   Neuro Re-ed 1 supine reverse pendulum   Neuro Re-ed 1 - Details x 20 AROM, 1-2#   Neuro Re-ed 2 supine ceiling punch x 20 AROM   Neuro Re-ed 2 - Details 1-2# x 20   Skilled Intervention manual and verbal cuing   Patient Response/Progress fatigue   Education   Learner/Method Patient;Demonstration;Pictures/Video;No Barriers to Learning   Plan   Home program see ptrx   Plan for next session bump ROM in all planes, advance to standing, careful with ER and and range posterior reach.   Comments   Comments RTC  work day 1 ,  Scap strength day 2   Total Session Time   Timed Code Treatment Minutes 40   Total Treatment Time (sum of timed and untimed services) 40             Referring Provider:  Nicolás Morel

## 2024-03-22 ENCOUNTER — THERAPY VISIT (OUTPATIENT)
Dept: PHYSICAL THERAPY | Facility: CLINIC | Age: 64
End: 2024-03-22
Payer: COMMERCIAL

## 2024-03-22 DIAGNOSIS — Z98.890 S/P SHOULDER SURGERY: Primary | ICD-10-CM

## 2024-03-22 DIAGNOSIS — M25.512 LEFT SHOULDER PAIN: ICD-10-CM

## 2024-03-22 PROCEDURE — 97110 THERAPEUTIC EXERCISES: CPT | Mod: GP | Performed by: PHYSICAL THERAPIST

## 2024-03-27 ENCOUNTER — THERAPY VISIT (OUTPATIENT)
Dept: PHYSICAL THERAPY | Facility: CLINIC | Age: 64
End: 2024-03-27
Payer: COMMERCIAL

## 2024-03-27 DIAGNOSIS — Z98.890 S/P SHOULDER SURGERY: Primary | ICD-10-CM

## 2024-03-27 DIAGNOSIS — M25.512 LEFT SHOULDER PAIN: ICD-10-CM

## 2024-03-27 PROCEDURE — 97140 MANUAL THERAPY 1/> REGIONS: CPT | Mod: GP | Performed by: PHYSICAL THERAPIST

## 2024-03-27 PROCEDURE — 97110 THERAPEUTIC EXERCISES: CPT | Mod: GP | Performed by: PHYSICAL THERAPIST

## 2024-04-03 ENCOUNTER — THERAPY VISIT (OUTPATIENT)
Dept: PHYSICAL THERAPY | Facility: CLINIC | Age: 64
End: 2024-04-03
Payer: COMMERCIAL

## 2024-04-03 DIAGNOSIS — M25.512 LEFT SHOULDER PAIN: ICD-10-CM

## 2024-04-03 DIAGNOSIS — Z98.890 S/P SHOULDER SURGERY: Primary | ICD-10-CM

## 2024-04-03 PROCEDURE — 97110 THERAPEUTIC EXERCISES: CPT | Mod: GP | Performed by: PHYSICAL THERAPIST

## 2024-04-03 PROCEDURE — 97140 MANUAL THERAPY 1/> REGIONS: CPT | Mod: GP | Performed by: PHYSICAL THERAPIST

## 2024-04-26 ENCOUNTER — THERAPY VISIT (OUTPATIENT)
Dept: PHYSICAL THERAPY | Facility: CLINIC | Age: 64
End: 2024-04-26
Payer: COMMERCIAL

## 2024-04-26 DIAGNOSIS — Z98.890 S/P SHOULDER SURGERY: Primary | ICD-10-CM

## 2024-04-26 DIAGNOSIS — M25.512 LEFT SHOULDER PAIN: ICD-10-CM

## 2024-04-26 PROCEDURE — 97140 MANUAL THERAPY 1/> REGIONS: CPT | Mod: GP | Performed by: PHYSICAL THERAPIST

## 2024-04-26 PROCEDURE — 97110 THERAPEUTIC EXERCISES: CPT | Mod: GP | Performed by: PHYSICAL THERAPIST

## 2024-04-26 NOTE — PROGRESS NOTES
PLAN  Continue therapy per current plan of care.     04/26/24 0500   Appointment Info   Signing clinician's name / credentials Tyler Romero PT ATC Cert MDT   Total/Authorized Visits 12   Visits Used 10   Medical Diagnosis Massive RCR   PT Tx Diagnosis Massive RCR   Other pertinent information SPADI  24/100   Progress Note/Certification   Onset of illness/injury or Date of Surgery 11/07/23   Therapy Frequency 2x/week   Predicted Duration 12 weeks   Progress Note Due Date 05/24/24   Progress Note Completed Date 04/26/24       Present No   PT Goal 1   Goal Identifier reaching   Goal Description Ability to actively reach to 150-170 degrees comparable to the opposite side   Rationale to maximize safety and independence with self cares;to maximize safety and independence with performance of ADLs and functional tasks;to maximize safety and independence within the home   Goal Progress AROM 160, with scap hike   Target Date 04/24/24   Subjective Report   Subjective Report 5 months post op L RCR . Pain is minimal. Patient has RTW with limitations at [facility].. Patient has met goals with reaching overhead, limited behind he back and laterally. Overall strength is restored to 60-70% of the opposite side.. Patient just seen Dr. Luna  a few days ago with examination and consult of RTW and palns. Unrestricted floor to waist, 15# to shoulder, no greater than 5 overhead. Stay the course with therapy and follow up in 8 weeks with PA.   Objective Measures   Objective Measures Objective Measure 1;Objective Measure 2;Objective Measure 3   Objective Measure 1   Objective Measure Shoulder ROM   Details flexion   160 vs 170 opposite side.  , ER 55,  IR 90 vs 170, 80, 85.   Objective Measure 2   Objective Measure Strength   Details weak ER and abduction   Objective Measure 3   Objective Measure scap control   Details slight hike and wing with elevation greater than 90 degrees   Treatment Interventions (PT)    Interventions Therapeutic Procedure/Exercise;Therapeutic Activity;Neuromuscular Re-education;Gait Training;Manual Therapy   Therapeutic Procedure/Exercise   Therapeutic Procedures: strength, endurance, ROM, flexibility minutes (60003) 25   Therapeutic Procedures Ther Proc 2;Ther Proc 3;Ther Proc 5;Ther Proc 4;Ther Proc 7;Ther Proc 6;Ther Proc 8;Ther Proc 9   Ther Proc 1 wand flexion, abduction, ER, IR , extension, posterior reach   Ther Proc 1 - Details x 5 , 10 sec hold   Ther Proc 2 Mabual ROM all direction   Ther Proc 2 - Details x 5-10 reps , 10 sec hold   Skilled Intervention manual and verbal   Patient Response/Progress fatigue   Neuromuscular Re-education   Neuromuscular Re-education Neuro Re-ed 2;Neuro Re-ed 4;Neuro Re-ed 3   Neuro Re-ed 1 see PTrx: added wall circles and belly press   Skilled Intervention manual and verbal cuing   Patient Response/Progress fatigue   Manual Therapy   Manual Therapy: Mobilization, MFR, MLD, friction massage minutes (38914) 15   Manual Therapy Manual Therapy 2;Manual Therapy 3;Manual Therapy 4   Manual Therapy 1 distraction   Manual Therapy 1 - Details 10 sec x 5   Manual Therapy 2 Posterior glides   Manual Therapy 2 - Details 10 sec x 5, grade 1-2   Education   Learner/Method Patient;Demonstration;Pictures/Video;No Barriers to Learning   Plan   Home program see ptrx   Plan for next session continue ROM in clinic, strength 3x/week at home   Comments   Comments RTC  work day 1 ,  Scap strength day 2   Total Session Time   Timed Code Treatment Minutes 40   Total Treatment Time (sum of timed and untimed services) 40         Referring Provider:  Nicolás oMrel

## 2024-04-30 ENCOUNTER — THERAPY VISIT (OUTPATIENT)
Dept: PHYSICAL THERAPY | Facility: CLINIC | Age: 64
End: 2024-04-30
Payer: COMMERCIAL

## 2024-04-30 DIAGNOSIS — M25.512 LEFT SHOULDER PAIN: ICD-10-CM

## 2024-04-30 DIAGNOSIS — Z98.890 S/P SHOULDER SURGERY: Primary | ICD-10-CM

## 2024-04-30 PROCEDURE — 97140 MANUAL THERAPY 1/> REGIONS: CPT | Mod: GP | Performed by: PHYSICAL THERAPIST

## 2024-04-30 PROCEDURE — 97110 THERAPEUTIC EXERCISES: CPT | Mod: GP | Performed by: PHYSICAL THERAPIST

## 2024-05-07 ENCOUNTER — THERAPY VISIT (OUTPATIENT)
Dept: PHYSICAL THERAPY | Facility: CLINIC | Age: 64
End: 2024-05-07
Payer: COMMERCIAL

## 2024-05-07 DIAGNOSIS — Z98.890 S/P SHOULDER SURGERY: Primary | ICD-10-CM

## 2024-05-07 DIAGNOSIS — M25.512 LEFT SHOULDER PAIN: ICD-10-CM

## 2024-05-07 PROCEDURE — 97112 NEUROMUSCULAR REEDUCATION: CPT | Mod: GP | Performed by: PHYSICAL THERAPIST

## 2024-05-07 PROCEDURE — 97110 THERAPEUTIC EXERCISES: CPT | Mod: GP | Performed by: PHYSICAL THERAPIST

## 2024-05-07 PROCEDURE — 97140 MANUAL THERAPY 1/> REGIONS: CPT | Mod: GP | Performed by: PHYSICAL THERAPIST

## 2024-05-16 ENCOUNTER — THERAPY VISIT (OUTPATIENT)
Dept: PHYSICAL THERAPY | Facility: CLINIC | Age: 64
End: 2024-05-16
Payer: COMMERCIAL

## 2024-05-16 DIAGNOSIS — M25.512 LEFT SHOULDER PAIN: ICD-10-CM

## 2024-05-16 DIAGNOSIS — Z98.890 S/P SHOULDER SURGERY: Primary | ICD-10-CM

## 2024-05-16 PROCEDURE — 97140 MANUAL THERAPY 1/> REGIONS: CPT | Mod: GP | Performed by: PHYSICAL THERAPIST

## 2024-05-16 PROCEDURE — 97110 THERAPEUTIC EXERCISES: CPT | Mod: GP | Performed by: PHYSICAL THERAPIST

## 2024-05-21 ENCOUNTER — THERAPY VISIT (OUTPATIENT)
Dept: PHYSICAL THERAPY | Facility: CLINIC | Age: 64
End: 2024-05-21
Payer: COMMERCIAL

## 2024-05-21 DIAGNOSIS — Z98.890 S/P SHOULDER SURGERY: Primary | ICD-10-CM

## 2024-05-21 DIAGNOSIS — M25.512 LEFT SHOULDER PAIN: ICD-10-CM

## 2024-05-21 PROCEDURE — 97110 THERAPEUTIC EXERCISES: CPT | Mod: GP | Performed by: PHYSICAL THERAPIST

## 2024-05-21 PROCEDURE — 97112 NEUROMUSCULAR REEDUCATION: CPT | Mod: GP | Performed by: PHYSICAL THERAPIST

## 2024-05-28 ENCOUNTER — THERAPY VISIT (OUTPATIENT)
Dept: PHYSICAL THERAPY | Facility: CLINIC | Age: 64
End: 2024-05-28
Payer: COMMERCIAL

## 2024-05-28 DIAGNOSIS — M25.512 LEFT SHOULDER PAIN: ICD-10-CM

## 2024-05-28 DIAGNOSIS — Z98.890 S/P SHOULDER SURGERY: Primary | ICD-10-CM

## 2024-05-28 PROCEDURE — 97112 NEUROMUSCULAR REEDUCATION: CPT | Mod: GP | Performed by: PHYSICAL THERAPIST

## 2024-05-28 PROCEDURE — 97110 THERAPEUTIC EXERCISES: CPT | Mod: GP | Performed by: PHYSICAL THERAPIST

## 2024-06-17 PROBLEM — Z71.89 ADVANCED DIRECTIVES, COUNSELING/DISCUSSION: Status: RESOLVED | Noted: 2018-08-02 | Resolved: 2024-06-17

## 2024-06-25 ENCOUNTER — THERAPY VISIT (OUTPATIENT)
Dept: PHYSICAL THERAPY | Facility: CLINIC | Age: 64
End: 2024-06-25
Payer: COMMERCIAL

## 2024-06-25 DIAGNOSIS — Z98.890 S/P SHOULDER SURGERY: Primary | ICD-10-CM

## 2024-06-25 DIAGNOSIS — M25.512 LEFT SHOULDER PAIN: ICD-10-CM

## 2024-06-25 PROCEDURE — 97112 NEUROMUSCULAR REEDUCATION: CPT | Mod: GP | Performed by: PHYSICAL THERAPIST

## 2024-06-25 PROCEDURE — 97110 THERAPEUTIC EXERCISES: CPT | Mod: GP | Performed by: PHYSICAL THERAPIST

## 2024-07-03 ENCOUNTER — THERAPY VISIT (OUTPATIENT)
Dept: PHYSICAL THERAPY | Facility: CLINIC | Age: 64
End: 2024-07-03
Payer: COMMERCIAL

## 2024-07-03 DIAGNOSIS — M25.512 LEFT SHOULDER PAIN: ICD-10-CM

## 2024-07-03 DIAGNOSIS — Z98.890 S/P SHOULDER SURGERY: Primary | ICD-10-CM

## 2024-07-03 PROCEDURE — 97110 THERAPEUTIC EXERCISES: CPT | Mod: GP | Performed by: PHYSICAL THERAPIST

## 2024-07-03 PROCEDURE — 97112 NEUROMUSCULAR REEDUCATION: CPT | Mod: GP | Performed by: PHYSICAL THERAPIST

## 2024-07-24 ENCOUNTER — THERAPY VISIT (OUTPATIENT)
Dept: PHYSICAL THERAPY | Facility: CLINIC | Age: 64
End: 2024-07-24
Payer: COMMERCIAL

## 2024-07-24 DIAGNOSIS — M25.512 LEFT SHOULDER PAIN: ICD-10-CM

## 2024-07-24 DIAGNOSIS — Z98.890 S/P SHOULDER SURGERY: Primary | ICD-10-CM

## 2024-07-24 PROCEDURE — 97110 THERAPEUTIC EXERCISES: CPT | Mod: GP | Performed by: PHYSICAL THERAPIST

## 2024-08-07 ENCOUNTER — THERAPY VISIT (OUTPATIENT)
Dept: PHYSICAL THERAPY | Facility: CLINIC | Age: 64
End: 2024-08-07
Payer: COMMERCIAL

## 2024-08-07 DIAGNOSIS — M25.512 LEFT SHOULDER PAIN: ICD-10-CM

## 2024-08-07 DIAGNOSIS — Z98.890 S/P SHOULDER SURGERY: Primary | ICD-10-CM

## 2024-08-07 PROCEDURE — 97140 MANUAL THERAPY 1/> REGIONS: CPT | Mod: GP | Performed by: PHYSICAL THERAPIST

## 2024-08-07 PROCEDURE — 97110 THERAPEUTIC EXERCISES: CPT | Mod: GP | Performed by: PHYSICAL THERAPIST

## 2024-08-07 PROCEDURE — 97112 NEUROMUSCULAR REEDUCATION: CPT | Mod: GP | Performed by: PHYSICAL THERAPIST

## 2024-08-07 NOTE — PROGRESS NOTES
PLAN     08/07/24 0500   Appointment Info   Signing clinician's name / credentials Tyler Romero PT ATC Cert MDT   Total/Authorized Visits 12   Visits Used 19   Medical Diagnosis Massive RCR   PT Tx Diagnosis Massive RCR   Other pertinent information SPADI  6/100   Progress Note/Certification   Onset of illness/injury or Date of Surgery 11/07/23   Therapy Frequency 2x/week   Predicted Duration 12 weeks   Progress Note Due Date 08/07/24   Progress Note Completed Date 08/07/24       Present No   GOALS   PT Goals 2   PT Goal 1   Goal Identifier reaching   Goal Description Ability to actively reach to 150-170 degrees comparable to the opposite side   Rationale to maximize safety and independence with self cares;to maximize safety and independence with performance of ADLs and functional tasks;to maximize safety and independence within the home   Goal Progress AROM 160, with scap hike   Target Date 04/24/24   Date Met 04/30/24   PT Goal 2   Goal Identifier lifting   Goal Description 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 He is happy with his outcome overall. He feels that he is still gaining strength and functuon. SPADI 6/100. Strength has been restored to roughly 60% of the opposite, ROM 90-95%, and function 95%. He is working FT at [employer]. No restrictions. He plans to work FT for additional 3 years. He would like to return to the weight room for strengthening. I advised working RTC strength to 80% or better before retunring to weight room and with restrictions as well. Follow up with   Adriel within 2 weeks for consult. No future PT is scheduled at this point. Perhaps suggestion is 1-2 visits after seeing MD to consult about strengtheng at the health club and assessing his readiness either in PT or at the MD office.   Objective Measures   Objective Measures Objective Measure 1;Objective Measure 2;Objective Measure 3;Objective Measure 4   Objective Measure 1   Objective Measure Shoulder ROM restored to 95% of the opposite side   Details Flexion 170 Assited flexion , Abduction  170, , IR80-90, ER 70 T 7 posterior reach   Objective Measure 2   Objective Measure Strength   Details strength improving, roughly 60-70% compared to the opposite side   Objective Measure 3   Objective Measure scap control   Details minimal to no hike   Objective Measure 4   Objective Measure palpation   Details soreness at posterior AC joint and RTC Interval of humeral head   Treatment Interventions (PT)   Interventions Therapeutic Procedure/Exercise;Neuromuscular Re-education;Manual Therapy   Therapeutic Procedure/Exercise   Therapeutic Procedures: strength, endurance, ROM, flexibility minutes (03544) 15   Therapeutic Procedures Ther Proc 2;Ther Proc 3;Ther Proc 5;Ther Proc 4;Ther Proc 7;Ther Proc 6;Ther Proc 8;Ther Proc 9   Ther Proc 1 RTC scaption 2 x 20 1-2 #   Ther Proc 1 - Details RTC Flexion 2 x 20 1-2#   Ther Proc 2 SL  ER 2 x 50 , no weight   Ther Proc 2 - Details Bear Hug 2 x 20 , 3 sec hold   Ther Proc 5 sleeper stretch   Ther Proc 5 - Details 3-5 reps x 10 sec hold   Ther Proc 7 strength test Sidelying   Ther Proc 7 - Details R   70              L 35   Ther Proc 8 strong and painless all MMT 5-/5 L arm, slight discomfort ER .   Ther Proc 8 - Details palpation soreness at posterior shoulder   Ther Proc 9 scap control is improving   Skilled Intervention manual and verbal   Patient Response/Progress fatigue   Neuromuscular Re-education   Neuromuscular re-ed of mvmt, balance, coord, kinesthetic sense, posture,  proprioception minutes (71306) 28   Neuromuscular Re-education Neuro Re-ed 2;Neuro Re-ed 4;Neuro Re-ed 3   Neuro Re-ed 1 scap retraction 8  plates x 15 , 2 sets   Neuro Re-ed 1 - Details arm extension 3 plates 2 x 15   Neuro Re-ed 2 bent over row 5# x 20   Neuro Re-ed 2 - Details Adduction 3 plates 2  x 15   Neuro Re-ed 3 swiss ball CW/CW x 30 each, single arm   Neuro Re-ed 3 - Details bicep Curl 10# 2 x 15   Neuro Re-ed 4 tricep extension 8 plates 2 x 15   Skilled Intervention manual and verbal cuing   Patient Response/Progress fatigue, bosu table top stressed shoulder   Manual Therapy   Manual Therapy: Mobilization, MFR, MLD, friction massage minutes (56127) 15   Manual Therapy Manual Therapy 2;Manual Therapy 4;Manual Therapy 3;Manual Therapy 5   Manual Therapy 1 distraction   Manual Therapy 1 - Details 10 sec x 10   Manual Therapy 2 inferior glide   Manual Therapy 2 - Details 10 sec x 10   Manual Therapy 3 posterior glide   Manual Therapy 3 - Details 10 sec x 10   Education   Learner/Method Patient;Demonstration;Pictures/Video;No Barriers to Learning   Plan   Home program see ptrx   Plan for next session continue ROM in clinic, strength 3x/week at home   Comments   Comments Follow up with MD in 2 weeks. Suggest 1-2 visits after to assess function and conclude PT   Total Session Time   Timed Code Treatment Minutes 58   Total Treatment Time (sum of timed and untimed services) 58           Referring Provider:  Nicolás Morel